# Patient Record
Sex: FEMALE | Race: WHITE | NOT HISPANIC OR LATINO | Employment: FULL TIME | URBAN - METROPOLITAN AREA
[De-identification: names, ages, dates, MRNs, and addresses within clinical notes are randomized per-mention and may not be internally consistent; named-entity substitution may affect disease eponyms.]

---

## 2020-09-04 NOTE — TELEPHONE ENCOUNTER
Pt last seen 8/24/2020, pt requesting a 90 day supply be sent to pharmacy at [facility] in [place].

## 2020-09-08 RX ORDER — NORETHINDRONE ACETATE AND ETHINYL ESTRADIOL AND FERROUS FUMARATE 1MG-20(24)
1 KIT ORAL DAILY
Qty: 84 TABLET | Refills: 4 | Status: SHIPPED | OUTPATIENT
Start: 2020-09-08 | End: 2020-09-08 | Stop reason: SDUPTHER

## 2020-09-08 RX ORDER — NORETHINDRONE ACETATE AND ETHINYL ESTRADIOL AND FERROUS FUMARATE 1MG-20(24)
1 KIT ORAL DAILY
Qty: 84 TABLET | Refills: 4 | Status: SHIPPED | OUTPATIENT
Start: 2020-09-08 | End: 2021-03-30 | Stop reason: SDUPTHER

## 2021-03-30 DIAGNOSIS — Z30.41 ENCOUNTER FOR SURVEILLANCE OF CONTRACEPTIVE PILLS: Primary | ICD-10-CM

## 2021-03-30 RX ORDER — NORETHINDRONE ACETATE AND ETHINYL ESTRADIOL 1MG-20(21)
1 KIT ORAL DAILY
Qty: 28 TABLET | Refills: 4 | Status: SHIPPED | OUTPATIENT
Start: 2021-03-30 | End: 2021-09-29

## 2021-03-30 RX ORDER — NORETHINDRONE ACETATE AND ETHINYL ESTRADIOL 1MG-20(21)
KIT ORAL
COMMUNITY
Start: 2021-03-14 | End: 2021-03-30 | Stop reason: SDUPTHER

## 2021-03-30 NOTE — TELEPHONE ENCOUNTER
Last annual was 08/24/2020, [facility] pharmacy sent fax stating we had prescribed Junel FE, but dispensed alternative (Blisovi) due to insurance coverage, requesting refills on Blisovi. PT not due for annual until 08/24/2021. Please advise, thanks.

## 2021-04-19 ENCOUNTER — TELEPHONE (OUTPATIENT)
Dept: OBSTETRICS AND GYNECOLOGY | Facility: CLINIC | Age: 26
End: 2021-04-19

## 2021-04-19 NOTE — TELEPHONE ENCOUNTER
Patient states she is on her honeymoon and is having swelling in vagina and yellow discharge and painful urination, and would like advise

## 2021-04-19 NOTE — TELEPHONE ENCOUNTER
Pt was  Sat and had IC for the first time. Sun she woke up very swollen with yellow discharge and burning with urination. Today the burning with urination is better but she is still very swollen and cont to have the yellow discharge.   Will d/w JNA and call pt back.

## 2021-04-19 NOTE — TELEPHONE ENCOUNTER
Per JNA; nothing worrisome at this point, most likely due to first time having IC. Detailed message left on VM for pt to call back with questions or concerns.

## 2021-07-18 PROCEDURE — 87147 CULTURE TYPE IMMUNOLOGIC: CPT | Performed by: FAMILY MEDICINE

## 2021-07-18 PROCEDURE — 87086 URINE CULTURE/COLONY COUNT: CPT | Performed by: FAMILY MEDICINE

## 2021-07-20 ENCOUNTER — TELEPHONE (OUTPATIENT)
Dept: URGENT CARE | Facility: CLINIC | Age: 26
End: 2021-07-20

## 2021-07-20 NOTE — TELEPHONE ENCOUNTER
----- Message from Edward Rinaldi MD sent at 7/20/2021  8:20 AM EDT -----  Urine culture grew greater than 100,000 staph species.  This would typically not be sensitive to the Macrobid which she is taken.  If she continues to have symptoms she would need to be switched to cephalexin 500 mg p.o. 3 times daily for 7 days.  If she is doing better then she can complete the current course of antibiotics

## 2021-09-29 ENCOUNTER — OFFICE VISIT (OUTPATIENT)
Dept: OBSTETRICS AND GYNECOLOGY | Facility: CLINIC | Age: 26
End: 2021-09-29

## 2021-09-29 VITALS
BODY MASS INDEX: 26.06 KG/M2 | WEIGHT: 156.4 LBS | SYSTOLIC BLOOD PRESSURE: 100 MMHG | HEIGHT: 65 IN | DIASTOLIC BLOOD PRESSURE: 64 MMHG

## 2021-09-29 DIAGNOSIS — Z01.419 WOMEN'S ANNUAL ROUTINE GYNECOLOGICAL EXAMINATION: ICD-10-CM

## 2021-09-29 DIAGNOSIS — Z01.419 WOMEN'S ANNUAL ROUTINE GYNECOLOGICAL EXAMINATION: Primary | ICD-10-CM

## 2021-09-29 PROCEDURE — 99395 PREV VISIT EST AGE 18-39: CPT | Performed by: NURSE PRACTITIONER

## 2021-09-29 RX ORDER — ETONOGESTREL AND ETHINYL ESTRADIOL 11.7; 2.7 MG/1; MG/1
INSERT, EXTENDED RELEASE VAGINAL
Qty: 1 EACH | Refills: 12 | Status: SHIPPED | OUTPATIENT
Start: 2021-09-29 | End: 2021-09-29

## 2021-09-29 NOTE — PROGRESS NOTES
GYN Annual Exam     CC - Here for annual exam.        JING Baker is a 25 y.o. female, , who presents for annual well woman exam. Patient's last menstrual period was 2021 (approximate)..  Periods are irregular.  Dysmenorrhea:mild, occurring premenstrually.  Patient reports problems with: none.  There were no changes to her medical or surgical history since her last visit.. Partner Status: Marital Status: .  She is sexually active. She has had new partners since her last STD testing. She has had her HPV vaccines. She is currently on Blisovi  and she is interested in changing to Nuva Ring.     Additional OB/GYN History   Current contraception: oral contraceptive pills  Desires to: continue contraception- discuss nuvaring  Last Pap : 2020- pap w/ reflex- negative  Last Completed Pap Smear     This patient has no relevant Health Maintenance data.        History of abnormal Pap smear: no  Family history of uterine, colon, breast, or ovarian cancer: no  Performs monthly Self-Breast Exam: no  Exercises Regularly:yes  Feelings of Anxiety or Depression: no  Tobacco Usage?: No   OB History        0    Para   0    Term   0       0    AB   0    Living   0       SAB   0    TAB   0    Ectopic   0    Molar   0    Multiple   0    Live Births   0                Health Maintenance   Topic Date Due   • Annual Gynecologic Pelvic and Breast Exam  Never done   • ANNUAL PHYSICAL  Never done   • HPV VACCINES (1 - 2-dose series) Never done   • COVID-19 Vaccine (1) Never done   • TDAP/TD VACCINES (1 - Tdap) Never done   • HEPATITIS C SCREENING  Never done   • PAP SMEAR  Never done   • INFLUENZA VACCINE  10/01/2021   • Pneumococcal Vaccine 0-64  Aged Out       The additional following portions of the patient's history were reviewed and updated as appropriate: allergies, current medications, past family history, past medical history, past social history, past surgical history and problem  "list.    Review of Systems   Constitutional: Negative.    HENT: Negative.    Eyes: Negative.    Respiratory: Negative.    Cardiovascular: Negative.    Gastrointestinal: Negative.    Endocrine: Negative.    Genitourinary: Negative.    Musculoskeletal: Negative.    Skin: Negative.    Allergic/Immunologic: Negative.    Neurological: Negative.    Hematological: Negative.    Psychiatric/Behavioral: Negative.          I have reviewed and agree with the HPI, ROS, and historical information as entered above. Aicha Gaona, APRN    Objective   /64   Ht 165.1 cm (65\")   Wt 70.9 kg (156 lb 6.4 oz)   LMP 07/13/2021 (Approximate)   Breastfeeding No   BMI 26.03 kg/m²     Physical Exam  Vitals and nursing note reviewed. Exam conducted with a chaperone present.   Constitutional:       Appearance: Normal appearance. She is normal weight.   Cardiovascular:      Rate and Rhythm: Normal rate and regular rhythm.   Chest:      Breasts:         Right: Normal.         Left: Normal.   Abdominal:      Palpations: Abdomen is soft.   Genitourinary:     General: Normal vulva.      Vagina: Normal.      Cervix: Normal.      Uterus: Normal.       Adnexa: Right adnexa normal and left adnexa normal.      Rectum: Normal.   Neurological:      Mental Status: She is alert.            Assessment and Plan    Problem List Items Addressed This Visit     None      Visit Diagnoses     Women's annual routine gynecological examination    -  Primary    Relevant Orders    Pap IG, Ct-Ng, Rfx HPV ASCU          1. GYN annual well woman exam.   2. She was due to start ocps 3 days ago. She is interested in starting Nuva Ring instead. Will put in Nuva Ring tonight.   3. OCP's/Vaginal Ring - Discussed side effects of nausea, BTB, headaches, breast tenderness and slight weight gain in the first three cycles.  Understands risks of blood clots, stroke, and theoretical risk of breast cancer.  Denies family history of blood clots.  4. Reviewed monthly self breast " exams.  Instructed to call with lumps, pain, or breast discharge.    5. Reviewed exercise as a preventative health measures.   6. Reccommended Flu Vaccine in Fall of each year.  7. RTC in 1 year or PRN with problems  No follow-ups on file.      Aicha Gaona, APRN  09/29/2021

## 2021-10-04 RX ORDER — ETONOGESTREL AND ETHINYL ESTRADIOL 11.7; 2.7 MG/1; MG/1
INSERT, EXTENDED RELEASE VAGINAL
Qty: 3 EACH | Refills: 0 | Status: SHIPPED | OUTPATIENT
Start: 2021-10-04 | End: 2022-10-26

## 2021-10-07 DIAGNOSIS — Z01.419 WOMEN'S ANNUAL ROUTINE GYNECOLOGICAL EXAMINATION: ICD-10-CM

## 2021-10-22 DIAGNOSIS — Z30.41 ENCOUNTER FOR SURVEILLANCE OF CONTRACEPTIVE PILLS: ICD-10-CM

## 2021-10-22 RX ORDER — NORETHINDRONE ACETATE AND ETHINYL ESTRADIOL 1MG-20(21)
KIT ORAL
Qty: 56 TABLET | OUTPATIENT
Start: 2021-10-22

## 2022-10-26 ENCOUNTER — OFFICE VISIT (OUTPATIENT)
Dept: OBSTETRICS AND GYNECOLOGY | Facility: CLINIC | Age: 27
End: 2022-10-26

## 2022-10-26 VITALS
SYSTOLIC BLOOD PRESSURE: 122 MMHG | WEIGHT: 164 LBS | HEIGHT: 65 IN | DIASTOLIC BLOOD PRESSURE: 60 MMHG | BODY MASS INDEX: 27.32 KG/M2

## 2022-10-26 DIAGNOSIS — Z01.419 WOMEN'S ANNUAL ROUTINE GYNECOLOGICAL EXAMINATION: Primary | ICD-10-CM

## 2022-10-26 PROCEDURE — 99395 PREV VISIT EST AGE 18-39: CPT | Performed by: NURSE PRACTITIONER

## 2022-10-26 NOTE — PROGRESS NOTES
Gynecologic Annual Exam Note        Gynecologic Exam        Subjective     HPI  Иван Baker is a 26 y.o.  female who presents for annual well woman exam as a established patient. There were no changes to her medical or surgical history since her last visit.. Patient reports problems with: none. Patient's last menstrual period was 10/06/2022 (exact date).. Her periods are regular every 25-35 days, lasting 4 days. The flow is moderate. Dysmenorrhea:moderate, occurring first 1-2 days of flow. . Partner Status: Marital Status: .  She is sexually active. She has not had new partners.. STD testing recommendations have been explained to the patient and she does not desire STD testing. Patient reports she stopped using Nuvaring in 2022.    Additional OB/GYN History   Current contraception: contraceptive methods: Condoms  Desires to: do not start contraception      History of STD: no    Last Pap : 2021. Results: negative. HPV: not done STD: Neg  Last Completed Pap Smear          Ordered - PAP SMEAR (Every 3 Years) Ordered on 10/26/2022    2021  SCANNED - PAP SMEAR                 History of abnormal Pap smear: no  Gardasil status:completed  Family history of uterine, colon, breast, or ovarian cancer: no  Performs monthly Self-Breast Exam: no  Exercises Regularly:yes  Feelings of Anxiety or Depression: no  Tobacco Usage?: No     No current outpatient medications on file.     Patient denies the need for medication refills today.    OB History        0    Para   0    Term   0       0    AB   0    Living   0       SAB   0    IAB   0    Ectopic   0    Molar   0    Multiple   0    Live Births   0                Health Maintenance   Topic Date Due   • COVID-19 Vaccine (1) Never done   • ANNUAL PHYSICAL  Never done   • HPV VACCINES (1 - 2-dose series) Never done   • TDAP/TD VACCINES (1 - Tdap) Never done   • HEPATITIS C SCREENING  Never done   • INFLUENZA VACCINE  Never done   •  "Annual Gynecologic Pelvic and Breast Exam  09/30/2022   • PAP SMEAR  09/29/2024   • Pneumococcal Vaccine 0-64  Aged Out       Past Medical History:   Diagnosis Date   • Dysmenorrhea    • HPV vaccines complete    • Irregular menses    • Pap smear 08/19/2020    pap w/ reflex, GC/CHL- negative   • Pap smear 08/24/2020    pap w/ reflex- negative        History reviewed. No pertinent surgical history.    The additional following portions of the patient's history were reviewed and updated as appropriate: allergies, current medications, past family history, past medical history, past social history and past surgical history.    Review of Systems   Constitutional: Negative.    Respiratory: Negative.    Cardiovascular: Negative.    Gastrointestinal: Negative.    Genitourinary: Negative.          I have reviewed and agree with the HPI, ROS, and historical information as entered above. Any Mazariegos, APRN        Objective   /60 (BP Location: Right arm, Patient Position: Sitting, Cuff Size: Adult)   Ht 165.1 cm (65\")   Wt 74.4 kg (164 lb)   LMP 10/06/2022 (Exact Date)   Breastfeeding No   BMI 27.29 kg/m²     Physical Exam  Vitals and nursing note reviewed. Exam conducted with a chaperone present.   Constitutional:       Appearance: She is well-developed.   HENT:      Head: Normocephalic and atraumatic.   Neck:      Thyroid: No thyroid mass or thyromegaly.   Cardiovascular:      Heart sounds: No murmur heard.  Pulmonary:      Effort: Pulmonary effort is normal. No retractions.      Breath sounds: No wheezing, rhonchi or rales.   Chest:      Chest wall: No mass or tenderness.   Breasts:     Right: Normal. No mass, nipple discharge, skin change or tenderness.      Left: Normal. No mass, nipple discharge, skin change or tenderness.   Abdominal:      Palpations: Abdomen is soft. Abdomen is not rigid. There is no mass.      Tenderness: There is no abdominal tenderness. There is no guarding.      Hernia: No hernia is " present. There is no hernia in the left inguinal area.   Genitourinary:     Labia:         Right: No rash, tenderness or lesion.         Left: No rash, tenderness or lesion.       Vagina: Normal. No vaginal discharge or lesions.      Cervix: No cervical motion tenderness, discharge, lesion or cervical bleeding.      Uterus: Normal. Not enlarged, not fixed and not tender.       Adnexa:         Right: No mass or tenderness.          Left: No mass or tenderness.        Rectum: No external hemorrhoid.   Musculoskeletal:      Cervical back: Normal range of motion. No muscular tenderness.   Neurological:      Mental Status: She is alert and oriented to person, place, and time.   Psychiatric:         Behavior: Behavior normal.            Assessment and Plan    Problem List Items Addressed This Visit    None  Visit Diagnoses     Women's annual routine gynecological examination    -  Primary    Relevant Orders    LIQUID-BASED PAP SMEAR, [facility] ([facility])        Happy using condoms    1. GYN annual well woman exam.   2. Reviewed pap guidelines.   3. Reviewed monthly self breast exams.  Instructed to call with lumps, pain, or breast discharge.    Return in about 1 year (around 10/26/2023) for Annual physical.      Any Mazariegos, APRN  10/26/2022

## 2022-10-27 LAB — REF LAB TEST METHOD: NORMAL

## 2023-11-02 ENCOUNTER — OFFICE VISIT (OUTPATIENT)
Dept: OBSTETRICS AND GYNECOLOGY | Facility: CLINIC | Age: 28
End: 2023-11-02
Payer: COMMERCIAL

## 2023-11-02 VITALS
DIASTOLIC BLOOD PRESSURE: 82 MMHG | SYSTOLIC BLOOD PRESSURE: 120 MMHG | HEIGHT: 65 IN | BODY MASS INDEX: 26.62 KG/M2 | WEIGHT: 159.8 LBS

## 2023-11-02 DIAGNOSIS — Z01.419 WELL WOMAN EXAM: Primary | ICD-10-CM

## 2023-11-02 NOTE — PROGRESS NOTES
Gynecologic Annual Exam Note        Annual Exam        Subjective     HPI  Иван Baker is a 27 y.o.  female who presents for annual well woman exam as an established patient. There were no changes to her medical or surgical history since her last visit. Patient reports no problems. Patient's last menstrual period was 10/15/2023 (exact date).. Her periods occur every 25-35 days , lasting 4 days. The flow is moderate. She reports dysmenorrhea is mild, occurring first 1-2 days of flow. Marital Status: .  She is sexually active. She has not had new partners. STD testing recommendations have been explained to the patient and she does not desire STD testing.    Additional OB/GYN History   Current contraception: contraceptive methods: Withdrawal   Desires to: do not start contraception- pt states it would be okay if they got pregnant-not trying but not interested in starting anything.   Thromboembolic Disease: none    History of STD: no    Last Pap : 10/26/22. Results: negative. HPV: not done.   Last Completed Pap Smear            Ordered - PAP SMEAR (Every 3 Years) Ordered on 2023      10/26/2022  LIQUID-BASED PAP SMEAR, [facility] ([facility])    2021  SCANNED - PAP SMEAR                     History of abnormal Pap smear: no  Gardasil status:completed  Family history of uterine, colon, breast, or ovarian cancer: no  Performs monthly Self-Breast Exam: no  Exercises Regularly:yes  Feelings of Anxiety or Depression: no  Tobacco Usage?: No     No current outpatient medications on file.     Patient denies the need for medication refills today.    OB History          0    Para   0    Term   0       0    AB   0    Living   0         SAB   0    IAB   0    Ectopic   0    Molar   0    Multiple   0    Live Births   0                Health Maintenance   Topic Date Due    BMI FOLLOWUP  Never done    COVID-19 Vaccine (1) Never done    HPV VACCINES (1 - 2-dose series) Never done    TDAP/TD  "VACCINES (1 - Tdap) Never done    HEPATITIS C SCREENING  Never done    ANNUAL PHYSICAL  Never done    INFLUENZA VACCINE  Never done    Annual Gynecologic Pelvic and Breast Exam  10/27/2023    PAP SMEAR  10/26/2025    Pneumococcal Vaccine 0-64  Aged Out       Past Medical History:   Diagnosis Date    Dysmenorrhea     HPV vaccines complete     Irregular menses     Pap smear 08/19/2020    pap w/ reflex, GC/CHL- negative    Pap smear 08/24/2020    pap w/ reflex- negative        History reviewed. No pertinent surgical history.    The additional following portions of the patient's history were reviewed and updated as appropriate: allergies, current medications, past family history, past medical history, past social history, past surgical history, and problem list.    Review of Systems   Constitutional: Negative.    HENT: Negative.     Eyes: Negative.    Respiratory: Negative.     Cardiovascular: Negative.    Gastrointestinal: Negative.    Endocrine: Negative.    Genitourinary: Negative.    Musculoskeletal: Negative.    Skin: Negative.    Allergic/Immunologic: Negative.    Neurological: Negative.    Hematological: Negative.    Psychiatric/Behavioral: Negative.           I have reviewed and agree with the HPI, ROS, and historical information as entered above. SENG Bruner          Objective   /82 (BP Location: Right arm, Patient Position: Sitting, Cuff Size: Adult)   Ht 165.1 cm (65\")   Wt 72.5 kg (159 lb 12.8 oz)   LMP 10/15/2023 (Exact Date)   Breastfeeding No   BMI 26.59 kg/m²     Physical Exam  Vitals and nursing note reviewed. Exam conducted with a chaperone present.   Constitutional:       General: She is not in acute distress.     Appearance: Normal appearance. She is well-developed and normal weight. She is not ill-appearing.   Neck:      Thyroid: No thyroid mass or thyromegaly.   Pulmonary:      Effort: Pulmonary effort is normal. No respiratory distress or retractions.   Chest:      Chest wall: No " mass.   Breasts:     Right: Normal. No mass, nipple discharge, skin change or tenderness.      Left: Normal. No mass, nipple discharge, skin change or tenderness.   Abdominal:      General: There is no distension.      Palpations: Abdomen is soft. Abdomen is not rigid. There is no mass.      Tenderness: There is no abdominal tenderness. There is no guarding or rebound.      Hernia: No hernia is present.   Genitourinary:     General: Normal vulva.      Exam position: Lithotomy position.      Labia:         Right: No rash, tenderness or lesion.         Left: No rash, tenderness or lesion.       Vagina: Normal. No vaginal discharge or lesions.      Cervix: Normal. No cervical motion tenderness, discharge, lesion, erythema or cervical bleeding.      Uterus: Normal. Not enlarged, not fixed and not tender.       Adnexa: Right adnexa normal and left adnexa normal.        Right: No mass or tenderness.          Left: No mass or tenderness.        Rectum: Normal. No external hemorrhoid.   Musculoskeletal:      Cervical back: No muscular tenderness.   Skin:     General: Skin is warm and dry.   Neurological:      Mental Status: She is alert and oriented to person, place, and time.   Psychiatric:         Mood and Affect: Mood normal.         Behavior: Behavior normal.            Assessment and Plan    Problem List Items Addressed This Visit    None  Visit Diagnoses       Well woman exam    -  Primary    Relevant Orders    LIQUID-BASED PAP SMEAR WITH HPV GENOTYPING REGARDLESS OF INTERPRETATION ([facility])            GYN annual well woman exam.   Reviewed pap guidelines. Obtained today.   Reviewed monthly self breast exams.  Instructed to call with lumps, pain, or breast discharge.    Reccommended Flu Vaccine in Fall of each year.  Recommended to be taking a PNV since she is not using contraceptives.   Return in about 1 year (around 11/2/2024) for Annual physical.      Daisha Garrido, SENG  11/02/2023

## 2023-11-03 LAB — REF LAB TEST METHOD: NORMAL

## 2024-11-12 ENCOUNTER — INITIAL PRENATAL (OUTPATIENT)
Dept: OBSTETRICS AND GYNECOLOGY | Facility: CLINIC | Age: 29
End: 2024-11-12
Payer: COMMERCIAL

## 2024-11-12 VITALS — SYSTOLIC BLOOD PRESSURE: 118 MMHG | DIASTOLIC BLOOD PRESSURE: 76 MMHG | WEIGHT: 157 LBS | BODY MASS INDEX: 26.13 KG/M2

## 2024-11-12 DIAGNOSIS — Z34.81 MULTIGRAVIDA IN FIRST TRIMESTER: Primary | ICD-10-CM

## 2024-11-12 LAB
BILIRUB BLD-MCNC: NEGATIVE MG/DL
CLARITY, POC: CLEAR
COLOR UR: YELLOW
GLUCOSE UR STRIP-MCNC: NEGATIVE MG/DL
KETONES UR QL: NEGATIVE
LEUKOCYTE EST, POC: ABNORMAL
NITRITE UR-MCNC: NEGATIVE MG/ML
PH UR: 7 [PH] (ref 5–8)
PROT UR STRIP-MCNC: NEGATIVE MG/DL
RBC # UR STRIP: NEGATIVE /UL
SP GR UR: 1.01 (ref 1–1.03)
UROBILINOGEN UR QL: ABNORMAL

## 2024-11-12 NOTE — PROGRESS NOTES
Initial ob visit     CC- Here for care of pregnancy        Иван Baker is a 29 y.o. female, , who presents for her first obstetrical visit.  Patient's last menstrual period was 2024.. Her SHERRI is 2025, by Last Menstrual Period. Current GA is 7w3d.     Initial positive test date : 10/21/24, UPT        Her periods are every 30 days.  Prior obstetric issues: last pregnancy was a chemical pregnancy  Patient's past medical history is significant for:  none .  Family history of genetic issues (includes FOB): none  Prior infections concerning in pregnancy (Rash, fever in last 2 weeks): No  Varicella Hx - vaccinated  Prior testing for Cystic Fibrosis Carrier or Sickle Cell Trait- no  Prepregnancy BMI - Body mass index is 26.13 kg/m².  History of STD: no  Hx of HSV for patient or partner: no  Ultrasound Today: yes    OB History    Para Term  AB Living   2 0 0 0 1 0   SAB IAB Ectopic Molar Multiple Live Births   0 0 0 0 0 0      # Outcome Date GA Lbr Ted/2nd Weight Sex Type Anes PTL Lv   2 Current            1 AB               Obstetric Comments   Chemical pregnancy       Additional Pertinent History   Last Pap : 23 Result: negative HPV: negative     Last Completed Pap Smear            PAP SMEAR (Every 3 Years) Next due on 2023  LIQUID-BASED PAP SMEAR WITH HPV GENOTYPING REGARDLESS OF INTERPRETATION ([facility])    10/26/2022  LIQUID-BASED PAP SMEAR, [facility] ([facility])    2021  SCANNED - PAP SMEAR                  History of abnormal Pap smear: no  Family history of uterine, colon, breast, or ovarian cancer: no  Feelings of Anxiety or Depression: no  Tobacco Usage?: No   Alcohol/Drug Use?: no  Over the age of 35 at delivery: no  Genetic Screening: desires wants to discuss with provider  Flu Status: Declines    PMH    Current Outpatient Medications:     PRENATAL VIT W/ FE BISG-FA PO, Take  by mouth., Disp: , Rfl:      Past Medical History:   Diagnosis  Date    Dysmenorrhea     HPV vaccines complete     Irregular menses     Pap smear 08/19/2020    pap w/ reflex, GC/CHL- negative    Pap smear 08/24/2020    pap w/ reflex- negative        History reviewed. No pertinent surgical history.    Review of Systems   Review of Systems   Constitutional: Negative.    HENT: Negative.     Eyes: Negative.    Respiratory: Negative.     Cardiovascular: Negative.    Gastrointestinal: Negative.    Endocrine: Negative.    Genitourinary: Negative.    Musculoskeletal: Negative.    Skin: Negative.    Allergic/Immunologic: Negative.    Neurological: Negative.    Hematological: Negative.    Psychiatric/Behavioral: Negative.         Patient Reports:  nausea, breast tenderness  Patient Denies:excessive nausea , excessive vomiting, and vaginal bleeding  All systems reviewed and otherwise normal.    I have reviewed and agree with the HPI, ROS, and historical information as entered above. Lesley Springer MD      /76   Wt 71.2 kg (157 lb)   LMP 09/21/2024   BMI 26.13 kg/m²     The additional following portions of the patient's history were reviewed and updated as appropriate: allergies, current medications, past family history, past medical history, past social history, past surgical history, and problem list.    Physical Exam  General:  well developed; well nourished  no acute distress  mentation appropriate   Chest/Respiratory: No labored breathing, normal respiratory effort, normal appearance, no respiratory noises noted   Heart:  not examined   Thyroid: normal to inspection and palpation   Breasts:  Not performed.   Abdomen: soft, non-tender; no masses  no umbilical or inguinal hernias are present  no hepato-splenomegaly   Pelvis: Not performed.        Assessment and Plan    Problem List Items Addressed This Visit       Multigravida in first trimester - Primary    Relevant Orders    POC Urinalysis Dipstick (Completed)    Obstetric Panel    HIV-1 / O / 2 Ag / Antibody    Urine  Culture - Urine, Urine, Clean Catch    Urinalysis With Microscopic - Urine, Clean Catch    Chlamydia trachomatis, Neisseria gonorrhoeae, PCR - Urine, Urine, Clean Catch    Urine Drug Screen - Urine, Clean Catch       Pregnancy at 7w3d  Reviewed routine prenatal care with the office and educational materials given  Lab(s) Ordered  Discussed options for genetic testing including first trimester nuchal translucency screen, genetic disease carrier testing, quadruple screen, and NIPT  Discontinue the use of all non-medicinal drugs and chemicals  Nausea/Vomiting - she does not desire medications at this time.  Discussed conservative ways to help with nausea.  Patient is on Prenatal vitamins  Activity recommendation : 150 minutes/week of moderate intensity aerobic activity unless we limit for bleeding, hypertension or other pregnancy complication   Return in about 4 weeks (around 12/10/2024) for Next scheduled follow up.      Lesley Springer MD  11/12/2024

## 2024-11-13 LAB
ABO GROUP BLD: ABNORMAL
AMPHETAMINES UR QL SCN: NEGATIVE NG/ML
APPEARANCE UR: CLEAR
BACTERIA #/AREA URNS HPF: ABNORMAL /HPF
BARBITURATES UR QL SCN: NEGATIVE NG/ML
BASOPHILS # BLD AUTO: 0.1 X10E3/UL (ref 0–0.2)
BASOPHILS NFR BLD AUTO: 1 %
BENZODIAZ UR QL SCN: NEGATIVE NG/ML
BILIRUB UR QL STRIP: NEGATIVE
BLD GP AB SCN SERPL QL: NEGATIVE
BZE UR QL SCN: NEGATIVE NG/ML
C TRACH RRNA SPEC QL NAA+PROBE: NEGATIVE
CANNABINOIDS UR QL SCN: NEGATIVE NG/ML
CASTS URNS MICRO: ABNORMAL
COLOR UR: YELLOW
CREAT UR-MCNC: 213.1 MG/DL (ref 20–300)
EOSINOPHIL # BLD AUTO: 0.1 X10E3/UL (ref 0–0.4)
EOSINOPHIL NFR BLD AUTO: 1 %
EPI CELLS #/AREA URNS HPF: ABNORMAL /HPF
ERYTHROCYTE [DISTWIDTH] IN BLOOD BY AUTOMATED COUNT: 11.6 % (ref 11.7–15.4)
GLUCOSE UR QL STRIP: NEGATIVE
HBV SURFACE AG SERPL QL IA: NEGATIVE
HCT VFR BLD AUTO: 40 % (ref 34–46.6)
HCV IGG SERPL QL IA: NON REACTIVE
HGB BLD-MCNC: 13.2 G/DL (ref 11.1–15.9)
HGB UR QL STRIP: NEGATIVE
HIV 1+2 AB+HIV1 P24 AG SERPL QL IA: NON REACTIVE
IMM GRANULOCYTES # BLD AUTO: 0 X10E3/UL (ref 0–0.1)
IMM GRANULOCYTES NFR BLD AUTO: 0 %
KETONES UR QL STRIP: NEGATIVE
LABORATORY COMMENT REPORT: NORMAL
LEUKOCYTE ESTERASE UR QL STRIP: NEGATIVE
LYMPHOCYTES # BLD AUTO: 2 X10E3/UL (ref 0.7–3.1)
LYMPHOCYTES NFR BLD AUTO: 17 %
MCH RBC QN AUTO: 31 PG (ref 26.6–33)
MCHC RBC AUTO-ENTMCNC: 33 G/DL (ref 31.5–35.7)
MCV RBC AUTO: 94 FL (ref 79–97)
METHADONE UR QL SCN: NEGATIVE NG/ML
MONOCYTES # BLD AUTO: 1 X10E3/UL (ref 0.1–0.9)
MONOCYTES NFR BLD AUTO: 8 %
N GONORRHOEA RRNA SPEC QL NAA+PROBE: NEGATIVE
NEUTROPHILS # BLD AUTO: 8.8 X10E3/UL (ref 1.4–7)
NEUTROPHILS NFR BLD AUTO: 73 %
NITRITE UR QL STRIP: NEGATIVE
OPIATES UR QL SCN: NEGATIVE NG/ML
OXYCODONE+OXYMORPHONE UR QL SCN: NEGATIVE NG/ML
PCP UR QL: NEGATIVE NG/ML
PH UR STRIP: 8 [PH] (ref 5–8)
PH UR: 7.9 [PH] (ref 4.5–8.9)
PLATELET # BLD AUTO: 312 X10E3/UL (ref 150–450)
PROPOXYPH UR QL SCN: NEGATIVE NG/ML
PROT UR QL STRIP: ABNORMAL
RBC # BLD AUTO: 4.26 X10E6/UL (ref 3.77–5.28)
RBC #/AREA URNS HPF: ABNORMAL /HPF
RH BLD: POSITIVE
RPR SER QL: NON REACTIVE
RUBV IGG SERPL IA-ACNC: 1.25 INDEX
SP GR UR STRIP: 1.03 (ref 1–1.03)
UROBILINOGEN UR STRIP-MCNC: ABNORMAL MG/DL
WBC # BLD AUTO: 11.9 X10E3/UL (ref 3.4–10.8)
WBC #/AREA URNS HPF: ABNORMAL /HPF

## 2024-11-14 LAB
BACTERIA UR CULT: NORMAL
BACTERIA UR CULT: NORMAL

## 2024-11-21 ENCOUNTER — TELEPHONE (OUTPATIENT)
Dept: OBSTETRICS AND GYNECOLOGY | Facility: CLINIC | Age: 29
End: 2024-11-21
Payer: COMMERCIAL

## 2024-11-21 NOTE — TELEPHONE ENCOUNTER
MACY SCHUSTER     [phone]    PT WAS A PT OF DR MURRAY 3yrs AGO     PT SAW DR RENTERIA 11/12/24 & DECIDED SHE WOULD LIKE TO TRANSFER CARE TO DR MURRAY IF SHE DOES NOT HAVE ANY AVAILABILITIES SHE CAN SEE DR MICHAELS.    PT HAS NO TIES TO ANY PROVIDER & SHE WILL DECIDE WHICH PROVIDER SHE WANTS TO SEE FOR THE DURATION OF HER PREGNANCY.

## 2025-04-10 ENCOUNTER — LAB (OUTPATIENT)
Dept: LAB | Facility: HOSPITAL | Age: 30
End: 2025-04-10
Payer: COMMERCIAL

## 2025-04-10 ENCOUNTER — TRANSCRIBE ORDERS (OUTPATIENT)
Dept: LAB | Facility: HOSPITAL | Age: 30
End: 2025-04-10

## 2025-04-10 DIAGNOSIS — Z3A.28 28 WEEKS GESTATION OF PREGNANCY: ICD-10-CM

## 2025-04-10 DIAGNOSIS — Z34.93 THIRD TRIMESTER PREGNANCY: ICD-10-CM

## 2025-04-10 DIAGNOSIS — Z34.93 THIRD TRIMESTER PREGNANCY: Primary | ICD-10-CM

## 2025-04-10 LAB
BLD GP AB SCN SERPL QL: NEGATIVE
DEPRECATED RDW RBC AUTO: 41.9 FL (ref 37–54)
ERYTHROCYTE [DISTWIDTH] IN BLOOD BY AUTOMATED COUNT: 12.1 % (ref 12.3–15.4)
GLUCOSE 1H P 100 G GLC PO SERPL-MCNC: 88 MG/DL (ref 65–139)
HCT VFR BLD AUTO: 39.7 % (ref 34–46.6)
HGB BLD-MCNC: 12.9 G/DL (ref 12–15.9)
MCH RBC QN AUTO: 30.7 PG (ref 26.6–33)
MCHC RBC AUTO-ENTMCNC: 32.5 G/DL (ref 31.5–35.7)
MCV RBC AUTO: 94.5 FL (ref 79–97)
PLATELET # BLD AUTO: 247 10*3/MM3 (ref 140–450)
PMV BLD AUTO: 10.7 FL (ref 6–12)
RBC # BLD AUTO: 4.2 10*6/MM3 (ref 3.77–5.28)
TREPONEMA PALLIDUM IGG+IGM AB [PRESENCE] IN SERUM OR PLASMA BY IMMUNOASSAY: NORMAL
WBC NRBC COR # BLD AUTO: 14.86 10*3/MM3 (ref 3.4–10.8)

## 2025-04-10 PROCEDURE — 36415 COLL VENOUS BLD VENIPUNCTURE: CPT

## 2025-04-10 PROCEDURE — 86850 RBC ANTIBODY SCREEN: CPT

## 2025-04-10 PROCEDURE — 82948 REAGENT STRIP/BLOOD GLUCOSE: CPT | Performed by: ADVANCED PRACTICE MIDWIFE

## 2025-04-10 PROCEDURE — 82950 GLUCOSE TEST: CPT

## 2025-04-10 PROCEDURE — 85027 COMPLETE CBC AUTOMATED: CPT

## 2025-04-10 PROCEDURE — 86780 TREPONEMA PALLIDUM: CPT

## 2025-05-18 ENCOUNTER — HOSPITAL ENCOUNTER (OUTPATIENT)
Facility: HOSPITAL | Age: 30
Discharge: HOME | End: 2025-05-18
Attending: ADVANCED PRACTICE MIDWIFE | Admitting: OBSTETRICS & GYNECOLOGY
Payer: COMMERCIAL

## 2025-05-18 VITALS
BODY MASS INDEX: 31.65 KG/M2 | RESPIRATION RATE: 16 BRPM | DIASTOLIC BLOOD PRESSURE: 72 MMHG | TEMPERATURE: 97.8 F | OXYGEN SATURATION: 99 % | HEART RATE: 76 BPM | SYSTOLIC BLOOD PRESSURE: 107 MMHG | HEIGHT: 65 IN | WEIGHT: 190 LBS

## 2025-05-18 PROCEDURE — G0463 HOSPITAL OUTPT CLINIC VISIT: HCPCS

## 2025-05-18 NOTE — H&P
"[facility]  [facility]    CC:    Swelling     HPI:    Patient is a 29 y.o. female  currently at 34w1d, who presents with a concern of swelling and a headache.   The headache is dull and not bad.  She has not taken anything to try to resolve it.  The swelling is in her feet and left wrist which has given her carpel tunnel syndrome.    Denies vaginal leaking and bleeding.  +FM        The following portions of the patients history were reviewed and updated as appropriate: current medications, allergies, past medical history, past surgical history, past family history, past social history and problem list .       Prenatal Information:   Maternal Prenatal Labs  Blood Type No results found for: \"ABO\"   Rh Status No results found for: \"RH\"   Antibody Screen No results found for: \"ABSCRN\"   Gonnorhea No results found for: \"GCCX\"   Chlamydia No results found for: \"CLAMYDCU\"   RPR No results found for: \"RPR\"   Syphilis Antibody No results found for: \"SYPHILIS\"   Rubella No results found for: \"RUBELLAIGGIN\"   Hepatitis B Surface Antigen No results found for: \"HEPBSAG\"   HIV-1 Antibody No results found for: \"LABHIV1\"   Hepatitis C Antibody No results found for: \"HEPCAB\"   Rapid Urin Drug Screen No results found for: \"AMPMETHU\", \"BARBITSCNUR\", \"LABBENZSCN\", \"LABMETHSCN\", \"LABOPIASCN\", \"THCURSCR\", \"COCAINEUR\", \"AMPHETSCREEN\", \"PROPOXSCN\", \"BUPRENORSCNU\", \"METAMPSCNUR\", \"OXYCODONESCN\", \"TRICYCLICSCN\"   Group B Strep Culture No results found for: \"GBSANTIGEN\"           External Prenatal Results       Pregnancy Outside Results - Transcribed From Office Records - See Scanned Records For Details       Test Value Date Time    ABO  A  24    Rh  Positive  24    Antibody Screen  Negative  04/10/25 1006       Negative  24    Varicella IgG       Rubella  1.25 index 24    Hgb  12.9 g/dL 04/10/25 1006       13.2 g/dL 24 0924    Hct  39.7 % 04/10/25 1006       40.0 " % 24    HgB A1c        1h GTT  88 mg/dL 04/10/25 1006    3h GTT Fasting       3h GTT 1 hour       3h GTT 2 hour       3h GTT 3 hour        Gonorrhea (discrete)  Negative  24    Chlamydia (discrete)  Negative  24    RPR  Non Reactive  24    Syphils cascade: TP-Ab (FTA)  Non-Reactive  04/10/25 1006    TP-Ab  Non-Reactive  04/10/25 1006    TP-Ab (EIA)       TPPA       HBsAg  Negative  24    Herpes Simplex Virus PCR       Herpes Simplex VIrus Culture       HIV  Non Reactive  24    Hep C RNA Quant PCR       Hep C Antibody  Non Reactive  24    AFP       NIPT       Cystic Fibrosis ([place])       Cystic Fibroisis        Spinal Muscular atrophy       Fragile X       Group B Strep       GBS Susceptibility to Clindamycin       GBS Susceptibility to Erythromycin       Fetal Fibronectin       Genetic Testing, Maternal Blood                 Drug Screening       Test Value Date Time    Urine Drug Screen       Amphetamine Screen  Negative ng/mL 24    Barbiturate Screen  Negative ng/mL 24    Benzodiazepine Screen  Negative ng/mL 24    Methadone Screen  Negative ng/mL 24    Phencyclidine Screen  Negative ng/mL 24    Opiates Screen       THC Screen       Cocaine Screen       Propoxyphene Screen  Negative ng/mL 24    Buprenorphine Screen       Methamphetamine Screen       Oxycodone Screen       Tricyclic Antidepressants Screen                 Legend    ^: Historical                              Past OB History:     OB History    Para Term  AB Living   2 0 0 0 1 0   SAB IAB Ectopic Molar Multiple Live Births   0 0 0 0 0 0      # Outcome Date GA Lbr Ted/2nd Weight Sex Type Anes PTL Lv   2 Current            1 AB               Obstetric Comments   Chemical pregnancy       Past Medical History: Past Medical History:   Diagnosis Date    Dysmenorrhea     HPV vaccines complete      Irregular menses     Pap smear 08/19/2020    pap w/ reflex, GC/CHL- negative    Pap smear 08/24/2020    pap w/ reflex- negative      Past Surgical History No past surgical history on file.   Family History: Family History   Problem Relation Age of Onset    No Known Problems Father     No Known Problems Mother     Breast cancer Neg Hx     Ovarian cancer Neg Hx     Uterine cancer Neg Hx     Colon cancer Neg Hx       Social History:  reports that she has never smoked. She has never used smokeless tobacco.   reports that she does not currently use alcohol.   reports no history of drug use.            Objective     Vital Signs Range for the last 24 hours  Temperature: Temp:  [97.8 °F (36.6 °C)] 97.8 °F (36.6 °C)   Temp Source: Temp src: Oral   BP: BP: (107-121)/(72-83) 107/72   Pulse: Heart Rate:  [76] 76   Respirations: Resp:  [16] 16   SPO2: SpO2:  [99 %] 99 %   O2 Amount (l/min):     O2 Devices     Weight: Weight:  [86.2 kg (190 lb)] 86.2 kg (190 lb)     Physical Examination: General appearance - alert, well appearing, and in no distress and oriented to person, place, and time  Chest - Breathing is unlaboured   Heart - Regula rate   Abdomen - soft, nontender, nondistended, gravid non-tender  Extremities - pedal edema +1         Fetal Heart Rate Assessment   Method: Fetal HR Assessment Method: external   Beats/min: Fetal HR (beats/min): 130   Baseline: Fetal HR Baseline: normal range   Varibility: Fetal HR Variability: moderate (amplitude range 6 to 25 bpm)   Accels: Fetal HR Accelerations: greater than/equal to 15 bpm, lasting at least 15 seconds   Decels: Fetal HR Decelerations: absent   Tracing Category:       Uterine Assessment   Method: Method: external tocotransducer   Frequency (min): Contraction Frequency (Minutes): x2   Ctx Count in 10 min:     Duration:     Intensity: Contraction Intensity: no contractions   Intensity by IUPC:     Resting Tone:     Resting Tone by IUPC:     [place] Units:      NST                      Indication:  Headache   Start time: 0900                 End time: 0940  15 x 15 accels x 2  Yes  No decels  Baseline  130s  Reactive NST - Yes           Assessment/Plan  1. Intrauterine pregnancy at 34w1d gestation with reactive fetal status  2. Swelling - Some lower extremity swelling and left wrist carpel tunnel syndrome due to pregnancy  - given education and reassurance    3.  Headache - mild and encouraged her to stay hydrated and take some Tylenol.   She has normal BPs and no concerns of pre-eclampsia   4.  Questions answered   5.  D/C home.         Markell Seymour MD  5/18/2025  09:47 EDT

## 2025-06-05 LAB — EXTERNAL GROUP B STREP ANTIGEN: NEGATIVE

## 2025-06-25 ENCOUNTER — HOSPITAL ENCOUNTER (INPATIENT)
Facility: HOSPITAL | Age: 30
LOS: 3 days | Discharge: HOME | End: 2025-06-28
Attending: ADVANCED PRACTICE MIDWIFE | Admitting: OBSTETRICS & GYNECOLOGY
Payer: COMMERCIAL

## 2025-06-25 ENCOUNTER — ANESTHESIA EVENT (OUTPATIENT)
Dept: LABOR AND DELIVERY | Facility: HOSPITAL | Age: 30
End: 2025-06-25
Payer: COMMERCIAL

## 2025-06-25 ENCOUNTER — ANESTHESIA (OUTPATIENT)
Dept: LABOR AND DELIVERY | Facility: HOSPITAL | Age: 30
End: 2025-06-25
Payer: COMMERCIAL

## 2025-06-25 PROBLEM — Z3A.39 39 WEEKS GESTATION OF PREGNANCY: Status: ACTIVE | Noted: 2025-06-25

## 2025-06-25 LAB
ABO GROUP BLD: NORMAL
ABO GROUP BLD: NORMAL
BLD GP AB SCN SERPL QL: NEGATIVE
DEPRECATED RDW RBC AUTO: 41.9 FL (ref 37–54)
ERYTHROCYTE [DISTWIDTH] IN BLOOD BY AUTOMATED COUNT: 12.7 % (ref 12.3–15.4)
HCT VFR BLD AUTO: 39 % (ref 34–46.6)
HGB BLD-MCNC: 13 G/DL (ref 12–15.9)
MCH RBC QN AUTO: 30.7 PG (ref 26.6–33)
MCHC RBC AUTO-ENTMCNC: 33.3 G/DL (ref 31.5–35.7)
MCV RBC AUTO: 92 FL (ref 79–97)
PLATELET # BLD AUTO: 257 10*3/MM3 (ref 140–450)
PMV BLD AUTO: 11 FL (ref 6–12)
RBC # BLD AUTO: 4.24 10*6/MM3 (ref 3.77–5.28)
RH BLD: POSITIVE
RH BLD: POSITIVE
T&S EXPIRATION DATE: NORMAL
TREPONEMA PALLIDUM IGG+IGM AB [PRESENCE] IN SERUM OR PLASMA BY IMMUNOASSAY: NORMAL
WBC NRBC COR # BLD AUTO: 19.18 10*3/MM3 (ref 3.4–10.8)

## 2025-06-25 PROCEDURE — 86850 RBC ANTIBODY SCREEN: CPT | Performed by: ADVANCED PRACTICE MIDWIFE

## 2025-06-25 PROCEDURE — 85027 COMPLETE CBC AUTOMATED: CPT | Performed by: ADVANCED PRACTICE MIDWIFE

## 2025-06-25 PROCEDURE — 25010000002 FENTANYL CITRATE (PF) 50 MCG/ML SOLUTION: Performed by: ANESTHESIOLOGY

## 2025-06-25 PROCEDURE — 25810000003 LACTATED RINGERS SOLUTION: Performed by: ADVANCED PRACTICE MIDWIFE

## 2025-06-25 PROCEDURE — 86900 BLOOD TYPING SEROLOGIC ABO: CPT | Performed by: ADVANCED PRACTICE MIDWIFE

## 2025-06-25 PROCEDURE — 59025 FETAL NON-STRESS TEST: CPT

## 2025-06-25 PROCEDURE — 25010000002 LIDOCAINE-EPINEPHRINE (PF) 1.5 %-1:200000 SOLUTION: Performed by: ANESTHESIOLOGY

## 2025-06-25 PROCEDURE — C1755 CATHETER, INTRASPINAL: HCPCS | Performed by: ANESTHESIOLOGY

## 2025-06-25 PROCEDURE — 99202 OFFICE O/P NEW SF 15 MIN: CPT | Performed by: OBSTETRICS & GYNECOLOGY

## 2025-06-25 PROCEDURE — 25010000002 BUPIVACAINE (PF) 0.25 % SOLUTION: Performed by: ANESTHESIOLOGY

## 2025-06-25 PROCEDURE — 25810000003 LACTATED RINGERS SOLUTION: Performed by: ANESTHESIOLOGY

## 2025-06-25 PROCEDURE — 86901 BLOOD TYPING SEROLOGIC RH(D): CPT

## 2025-06-25 PROCEDURE — 51702 INSERT TEMP BLADDER CATH: CPT

## 2025-06-25 PROCEDURE — 86901 BLOOD TYPING SEROLOGIC RH(D): CPT | Performed by: ADVANCED PRACTICE MIDWIFE

## 2025-06-25 PROCEDURE — 25010000002 ROPIVACAINE PER 1 MG: Performed by: ANESTHESIOLOGY

## 2025-06-25 PROCEDURE — 86900 BLOOD TYPING SEROLOGIC ABO: CPT

## 2025-06-25 PROCEDURE — 86780 TREPONEMA PALLIDUM: CPT | Performed by: ADVANCED PRACTICE MIDWIFE

## 2025-06-25 RX ORDER — FENTANYL CITRATE 50 UG/ML
INJECTION, SOLUTION INTRAMUSCULAR; INTRAVENOUS AS NEEDED
Status: DISCONTINUED | OUTPATIENT
Start: 2025-06-25 | End: 2025-06-26 | Stop reason: SURG

## 2025-06-25 RX ORDER — LIDOCAINE HYDROCHLORIDE 10 MG/ML
0.5 INJECTION, SOLUTION EPIDURAL; INFILTRATION; INTRACAUDAL; PERINEURAL ONCE AS NEEDED
Status: DISCONTINUED | OUTPATIENT
Start: 2025-06-25 | End: 2025-06-26 | Stop reason: HOSPADM

## 2025-06-25 RX ORDER — ROPIVACAINE HYDROCHLORIDE 2 MG/ML
15 INJECTION, SOLUTION EPIDURAL; INFILTRATION; PERINEURAL CONTINUOUS
Status: DISCONTINUED | OUTPATIENT
Start: 2025-06-25 | End: 2025-06-27

## 2025-06-25 RX ORDER — METOCLOPRAMIDE HYDROCHLORIDE 5 MG/ML
10 INJECTION INTRAMUSCULAR; INTRAVENOUS ONCE AS NEEDED
Status: DISCONTINUED | OUTPATIENT
Start: 2025-06-25 | End: 2025-06-26 | Stop reason: HOSPADM

## 2025-06-25 RX ORDER — SODIUM CHLORIDE, SODIUM LACTATE, POTASSIUM CHLORIDE, CALCIUM CHLORIDE 600; 310; 30; 20 MG/100ML; MG/100ML; MG/100ML; MG/100ML
125 INJECTION, SOLUTION INTRAVENOUS CONTINUOUS
Status: DISCONTINUED | OUTPATIENT
Start: 2025-06-25 | End: 2025-06-27

## 2025-06-25 RX ORDER — BUTORPHANOL TARTRATE 1 MG/ML
1 INJECTION, SOLUTION INTRAMUSCULAR; INTRAVENOUS
Status: DISCONTINUED | OUTPATIENT
Start: 2025-06-25 | End: 2025-06-26 | Stop reason: HOSPADM

## 2025-06-25 RX ORDER — SODIUM CHLORIDE 9 MG/ML
40 INJECTION, SOLUTION INTRAVENOUS AS NEEDED
Status: DISCONTINUED | OUTPATIENT
Start: 2025-06-25 | End: 2025-06-26 | Stop reason: HOSPADM

## 2025-06-25 RX ORDER — BUPIVACAINE HYDROCHLORIDE 2.5 MG/ML
INJECTION, SOLUTION EPIDURAL; INFILTRATION; INTRACAUDAL; PERINEURAL AS NEEDED
Status: DISCONTINUED | OUTPATIENT
Start: 2025-06-25 | End: 2025-06-26 | Stop reason: SURG

## 2025-06-25 RX ORDER — CITRIC ACID/SODIUM CITRATE 334-500MG
30 SOLUTION, ORAL ORAL ONCE
Status: DISCONTINUED | OUTPATIENT
Start: 2025-06-25 | End: 2025-06-26 | Stop reason: HOSPADM

## 2025-06-25 RX ORDER — EPHEDRINE SULFATE 5 MG/ML
10 INJECTION INTRAVENOUS
Status: DISCONTINUED | OUTPATIENT
Start: 2025-06-25 | End: 2025-06-26 | Stop reason: HOSPADM

## 2025-06-25 RX ORDER — LIDOCAINE HYDROCHLORIDE AND EPINEPHRINE 15; 5 MG/ML; UG/ML
INJECTION, SOLUTION EPIDURAL AS NEEDED
Status: DISCONTINUED | OUTPATIENT
Start: 2025-06-25 | End: 2025-06-26 | Stop reason: SURG

## 2025-06-25 RX ORDER — FAMOTIDINE 10 MG/ML
20 INJECTION, SOLUTION INTRAVENOUS EVERY 12 HOURS PRN
Status: DISCONTINUED | OUTPATIENT
Start: 2025-06-25 | End: 2025-06-26 | Stop reason: HOSPADM

## 2025-06-25 RX ORDER — FAMOTIDINE 20 MG/1
20 TABLET, FILM COATED ORAL EVERY 12 HOURS PRN
Status: DISCONTINUED | OUTPATIENT
Start: 2025-06-25 | End: 2025-06-26 | Stop reason: HOSPADM

## 2025-06-25 RX ORDER — EPHEDRINE SULFATE 5 MG/ML
INJECTION INTRAVENOUS
Status: DISCONTINUED
Start: 2025-06-25 | End: 2025-06-28 | Stop reason: HOSPADM

## 2025-06-25 RX ORDER — ONDANSETRON 4 MG/1
4 TABLET, ORALLY DISINTEGRATING ORAL EVERY 6 HOURS PRN
Status: DISCONTINUED | OUTPATIENT
Start: 2025-06-25 | End: 2025-06-26 | Stop reason: HOSPADM

## 2025-06-25 RX ORDER — DIPHENHYDRAMINE HYDROCHLORIDE 50 MG/ML
12.5 INJECTION, SOLUTION INTRAMUSCULAR; INTRAVENOUS EVERY 8 HOURS PRN
Status: DISCONTINUED | OUTPATIENT
Start: 2025-06-25 | End: 2025-06-26 | Stop reason: HOSPADM

## 2025-06-25 RX ORDER — BUTORPHANOL TARTRATE 2 MG/ML
2 INJECTION, SOLUTION INTRAMUSCULAR; INTRAVENOUS
Status: DISCONTINUED | OUTPATIENT
Start: 2025-06-25 | End: 2025-06-26 | Stop reason: HOSPADM

## 2025-06-25 RX ORDER — SODIUM CHLORIDE 0.9 % (FLUSH) 0.9 %
10 SYRINGE (ML) INJECTION AS NEEDED
Status: DISCONTINUED | OUTPATIENT
Start: 2025-06-25 | End: 2025-06-26 | Stop reason: HOSPADM

## 2025-06-25 RX ORDER — OXYTOCIN/0.9 % SODIUM CHLORIDE 30/500 ML
2-20 PLASTIC BAG, INJECTION (ML) INTRAVENOUS
Status: DISCONTINUED | OUTPATIENT
Start: 2025-06-25 | End: 2025-06-27

## 2025-06-25 RX ORDER — ACETAMINOPHEN 325 MG/1
650 TABLET ORAL EVERY 4 HOURS PRN
Status: DISCONTINUED | OUTPATIENT
Start: 2025-06-25 | End: 2025-06-26 | Stop reason: HOSPADM

## 2025-06-25 RX ORDER — ONDANSETRON 2 MG/ML
4 INJECTION INTRAMUSCULAR; INTRAVENOUS EVERY 6 HOURS PRN
Status: DISCONTINUED | OUTPATIENT
Start: 2025-06-25 | End: 2025-06-26 | Stop reason: HOSPADM

## 2025-06-25 RX ORDER — MAGNESIUM CARB/ALUMINUM HYDROX 105-160MG
30 TABLET,CHEWABLE ORAL ONCE
Status: COMPLETED | OUTPATIENT
Start: 2025-06-25 | End: 2025-06-26

## 2025-06-25 RX ORDER — SODIUM CHLORIDE 0.9 % (FLUSH) 0.9 %
10 SYRINGE (ML) INJECTION EVERY 12 HOURS SCHEDULED
Status: DISCONTINUED | OUTPATIENT
Start: 2025-06-25 | End: 2025-06-26 | Stop reason: HOSPADM

## 2025-06-25 RX ADMIN — SODIUM CHLORIDE, POTASSIUM CHLORIDE, SODIUM LACTATE AND CALCIUM CHLORIDE 500 ML: 600; 310; 30; 20 INJECTION, SOLUTION INTRAVENOUS at 12:20

## 2025-06-25 RX ADMIN — LIDOCAINE HYDROCHLORIDE AND EPINEPHRINE 3 ML: 15; 5 INJECTION, SOLUTION EPIDURAL at 19:25

## 2025-06-25 RX ADMIN — SODIUM CHLORIDE, POTASSIUM CHLORIDE, SODIUM LACTATE AND CALCIUM CHLORIDE 500 ML: 600; 310; 30; 20 INJECTION, SOLUTION INTRAVENOUS at 18:56

## 2025-06-25 RX ADMIN — ROPIVACAINE HYDROCHLORIDE 14 ML/HR: 2 INJECTION, SOLUTION EPIDURAL; INFILTRATION at 19:31

## 2025-06-25 RX ADMIN — BUPIVACAINE HYDROCHLORIDE 10 ML: 2.5 INJECTION, SOLUTION EPIDURAL; INFILTRATION; INTRACAUDAL; PERINEURAL at 19:27

## 2025-06-25 RX ADMIN — LIDOCAINE HYDROCHLORIDE AND EPINEPHRINE 2 ML: 15; 5 INJECTION, SOLUTION EPIDURAL at 19:26

## 2025-06-25 RX ADMIN — Medication 2 MILLI-UNITS/MIN: at 22:00

## 2025-06-25 RX ADMIN — FENTANYL CITRATE 100 MCG: 50 INJECTION, SOLUTION INTRAMUSCULAR; INTRAVENOUS at 19:27

## 2025-06-25 NOTE — ANESTHESIA PREPROCEDURE EVALUATION
Anesthesia Evaluation     Patient summary reviewed and Nursing notes reviewed                Airway   Mallampati: II  Neck ROM: full  No difficulty expected  Dental      Pulmonary - negative pulmonary ROS   Cardiovascular - negative cardio ROS        Neuro/Psych- negative ROS  GI/Hepatic/Renal/Endo - negative ROS     Musculoskeletal (-) negative ROS    Abdominal    Substance History - negative use     OB/GYN    (+) Pregnant        Other                    Anesthesia Plan    ASA 2     epidural       Anesthetic plan, risks, benefits, and alternatives have been provided, discussed and informed consent has been obtained with: patient.    CODE STATUS:    Code Status (Patient has no pulse and is not breathing): CPR (Attempt to Resuscitate)  Medical Interventions (Patient has pulse or is breathing): Full Support  Level Of Support Discussed With: Patient

## 2025-06-25 NOTE — H&P
"[facility]  Obstetric History and Physical    Chief Complaint   Patient presents with    Rupture of Membranes       Subjective     Patient is a 29 y.o. female  currently at 39w4d, who presents for term labor  with ROM. She voices good fetal movement. She denies vaginal bleeding. She notes SROM occurred at 0300. She plans no epidural for labor and birth.     Her prenatal care is benign.  Her previous obstetric/gynecological history is non-contributory.    The following portions of the patients history were reviewed and updated as appropriate: current medications, allergies, past medical history, past surgical history, past family history, past social history, and problem list .       Prenatal Information:   Maternal Prenatal Labs  Blood Type No results found for: \"ABO\"   Rh Status No results found for: \"RH\"   Antibody Screen No results found for: \"ABSCRN\"   Gonnorhea No results found for: \"GCCX\"   Chlamydia No results found for: \"CLAMYDCU\"   RPR No results found for: \"RPR\"   Syphilis Antibody No results found for: \"SYPHILIS\"   Rubella No results found for: \"RUBELLAIGGIN\"   Hepatitis B Surface Antigen No results found for: \"HEPBSAG\"   HIV-1 Antibody No results found for: \"LABHIV1\"   Hepatitis C Antibody No results found for: \"HEPCAB\"   Rapid Urin Drug Screen No results found for: \"AMPMETHU\", \"BARBITSCNUR\", \"LABBENZSCN\", \"LABMETHSCN\", \"LABOPIASCN\", \"THCURSCR\", \"COCAINEUR\", \"AMPHETSCREEN\", \"PROPOXSCN\", \"BUPRENORSCNU\", \"METAMPSCNUR\", \"OXYCODONESCN\", \"TRICYCLICSCN\"   Group B Strep Culture Negative                 Past OB History:       OB History    Para Term  AB Living   2 0 0 0 1 0   SAB IAB Ectopic Molar Multiple Live Births   0 0 0 0 0 0      # Outcome Date GA Lbr Ted/2nd Weight Sex Type Anes PTL Lv   2 Current            1 AB               Obstetric Comments   Chemical pregnancy       Past Medical History: Past Medical History:   Diagnosis Date    Dysmenorrhea     HPV vaccines complete     " Irregular menses     Pap smear 08/19/2020    pap w/ reflex, GC/CHL- negative    Pap smear 08/24/2020    pap w/ reflex- negative      Past Surgical History History reviewed. No pertinent surgical history.   Family History: Family History   Problem Relation Age of Onset    No Known Problems Father     No Known Problems Mother     Breast cancer Neg Hx     Ovarian cancer Neg Hx     Uterine cancer Neg Hx     Colon cancer Neg Hx       Social History:  reports that she has never smoked. She has never been exposed to tobacco smoke. She has never used smokeless tobacco.   reports that she does not currently use alcohol.   reports no history of drug use.        REVIEW OF SYSTEMS             Reports fetal movement is normal             Reports leakage of amniotic fluid.             Denies vaginal bleeding             She reports Regular contractions, frequency: Every 3 minutes, intensity mild             All other systems reviewed and are negative    Objective       Vital Signs Range for the last 24 hours  Temperature: Temp:  [97.9 °F (36.6 °C)] 97.9 °F (36.6 °C)   Temp Source: Temp src: Oral   BP: BP: (127-129)/(85-91) 127/85   Pulse: Heart Rate:  [86] 86   Respirations: Resp:  [16] 16   SPO2: SpO2:  [96 %] 96 %   O2 Amount (l/min):     O2 Devices     Weight: Weight:  [91.2 kg (201 lb)] 91.2 kg (201 lb)       Presentation: vertex   Cervix: Exam by:  RN   Dilation:  2   Effacement: Cervical Effacement: 60   Station:  -2       Fetal Heart Rate Assessment   Method: Fetal HR Assessment Method: external   Beats/min: Fetal HR (beats/min): 120   Baseline: Fetal HR Baseline: normal range   Varibility: Fetal HR Variability: marked (amplitude greater than 25 bpm)   Accels: Fetal HR Accelerations: greater than/equal to 15 bpm, lasting at least 15 seconds   Decels: Fetal HR Decelerations: absent   Tracing Category:  1    NST: REACTIVE     Uterine Assessment   Method: Method: external tocotransducer   Frequency (min): Contraction  Frequency (Minutes): 3-4   Ctx Count in 10 min:     Duration:     Intensity: Contraction Intensity: mild by palpation (mild to moderate)   Intensity by IUPC:     Resting Tone: Uterine Resting Tone: soft by palpation   Resting Tone by IUPC:     [place] Units:             Constitutional:  Well developed, well nourished, no acute distress, well-groomed.   Respiratory:  Resp e/e/u.   Cardiovascular:  Normal rate and rhythm, no murmurs.   Gastrointestinal:  Soft, gravid, nontender.  Uterus: Soft, nontender. Fundus appropriate for dates.  Neurologic:  Alert & oriented x 3,  no focal deficits noted.   Psychiatric:  Speech and behavior appropriate.   Extremities: no cyanosis, clubbing or edema, no evidence of DVT.        Labs:  Lab Results   Component Value Date    WBC 14.86 (H) 04/10/2025    HGB 12.9 04/10/2025    HCT 39.7 04/10/2025    MCV 94.5 04/10/2025     04/10/2025               Assessment & Plan       39 weeks gestation of pregnancy        Assessment:  1.  Intrauterine pregnancy at 39w4d weeks gestation with reactive fetal status.    2.   term labor  with ROM  3.  Obstetrical history is non-contributory.  4.  GBS status: Negative    Plan:  1.Expectant management. Discussed use of breast pump and position changes to augment labor. Discussed possible use of oxytocin to augment if no active labor by late afternoon/early evening.   2. Plan of care has been reviewed with patient and questions answered.  3.  Risks, benefits of treatment plan have been discussed.  4.  All questions have been answered.        Juliane Chaudhry CNM  6/25/2025  12:42 EDT

## 2025-06-25 NOTE — ANESTHESIA PROCEDURE NOTES
Labor Epidural      Patient reassessed immediately prior to procedure    Patient location during procedure: OB  Performed By  Anesthesiologist: Luís Dias DO  Preanesthetic Checklist  Completed: patient identified, IV checked, site marked, risks and benefits discussed, surgical consent, monitors and equipment checked, pre-op evaluation and timeout performed  Prep:  Pt Position:sitting  Sterile Tech:gloves, mask, sterile barrier and cap  Prep:chlorhexidine gluconate and isopropyl alcohol  Monitoring:blood pressure monitoring and continuous pulse oximetry  Epidural Block Procedure:  Approach:midline  Guidance:landmark technique and palpation technique  Location:L3-L4  Needle Type:Tuohy  Needle Gauge:17 G  Loss of Resistance Medium: air  Loss of Resistance: 6cm  Cath Depth at skin:12 cm  Paresthesia: none  Aspiration:negative  Test Dose:negative  Number of Attempts: 1  Post Assessment:  Dressing:secured with tape and occlusive dressing applied (Tegaderm Placed)  Pt Tolerance:patient tolerated the procedure well with no apparent complications  Complications:no

## 2025-06-25 NOTE — PROGRESS NOTES
"06/25/25  17:20 EDT  Иван Baker      ASSESSMENTS: Иван is breathing through contractions with good control. She has found little relief with nitrous oxide.     /74   Pulse 83   Temp 97.6 °F (36.4 °C) (Oral)   Resp 18   Ht 165.1 cm (65\")   Wt 91.2 kg (201 lb)   LMP 09/21/2024   SpO2 96%   Breastfeeding No   BMI 33.45 kg/m²     Fetal Heart Rate Assessment   Method: Fetal HR Assessment Method: external   Beats/min: Fetal HR (beats/min): 115   Baseline: Fetal HR Baseline: normal range   Varibility: Fetal HR Variability: moderate (amplitude range 6 to 25 bpm)   Accels: Fetal HR Accelerations: absent   Decels: Fetal HR Decelerations: absent   Tracing Category:  1     Uterine Assessment   Method: Method: external tocotransducer   Frequency (min): Contraction Frequency (Minutes): 3-6   Ctx Count in 10 min:     Duration:     Intensity: Contraction Intensity: moderate by palpation   Intensity by IUPC:     Resting Tone: Uterine Resting Tone: soft by palpation   Resting Tone by IUPC:     [place] Units:                                Presentation: vertex   Cervix: Exam by: Method: sterile vaginal exam performed (Tj Arriola RN)   Dilation: Cervical Dilation (cm): 5   Effacement: Cervical Effacement: 80   Station: Fetal Station: -1            Lab Results   Component Value Date    WBC 19.18 (H) 06/25/2025    HGB 13.0 06/25/2025    HCT 39.0 06/25/2025    MCV 92.0 06/25/2025     06/25/2025     Results from last 7 days   Lab Units 06/25/25  1230   ABO TYPING  A   RH TYPING  Positive   ANTIBODY SCREEN  Negative       PLAN: Continue current management. Ok to get in tub.     Juliane Chaudhry CNM  17:20 EDT  06/25/25  "

## 2025-06-25 NOTE — PROGRESS NOTES
"06/25/25  19:25 EDT  Иван Baker    SUBJECTIVE: ready for epidural.  Contractions painful.       ASSESSMENTS:     /84   Pulse 82   Temp 97.7 °F (36.5 °C) (Oral)   Resp 18   Ht 165.1 cm (65\")   Wt 91.2 kg (201 lb)   LMP 09/21/2024   SpO2 96%   Breastfeeding No   BMI 33.45 kg/m²     Fetal Heart Rate Assessment   Method: Fetal HR Assessment Method: external   Beats/min: Fetal HR (beats/min): 125   Baseline: Fetal HR Baseline: normal range   Varibility: Fetal HR Variability: moderate (amplitude range 6 to 25 bpm)   Accels: Fetal HR Accelerations: greater than/equal to 15 bpm, lasting at least 15 seconds   Decels: Fetal HR Decelerations: absent   Tracing Category:  1     Uterine Assessment   Method: Method: external tocotransducer   Frequency (min): Contraction Frequency (Minutes): 3-5   Ctx Count in 10 min:     Duration:     Intensity: Contraction Intensity: moderate by palpation   Intensity by IUPC:     Resting Tone: Uterine Resting Tone: soft by palpation   Resting Tone by IUPC:       Presentation: vtx   Cervix: Exam by: Method: sterile vaginal exam performed (Tj Arriola RN)   Dilation: Cervical Dilation (cm): 6   Effacement: Cervical Effacement: 90   Station: Fetal Station: -1            IUP at 39w4d   SROM          PLAN:  Epidural now  Expectant management.  Discussed pitocin augmentation prn  Repeat SVE in 2h or prn    Lakesha Lee MD  19:25 EDT  06/25/25     " Left message for patients daughter to call Dr. Bruce office to reschedule office visit. Thank you.

## 2025-06-26 PROBLEM — Z3A.39 39 WEEKS GESTATION OF PREGNANCY: Status: RESOLVED | Noted: 2025-06-25 | Resolved: 2025-06-26

## 2025-06-26 PROCEDURE — 0KQM0ZZ REPAIR PERINEUM MUSCLE, OPEN APPROACH: ICD-10-PCS | Performed by: OBSTETRICS & GYNECOLOGY

## 2025-06-26 PROCEDURE — 25810000003 LACTATED RINGERS PER 1000 ML: Performed by: ADVANCED PRACTICE MIDWIFE

## 2025-06-26 RX ORDER — HYDROCODONE BITARTRATE AND ACETAMINOPHEN 5; 325 MG/1; MG/1
1 TABLET ORAL EVERY 4 HOURS PRN
Status: DISCONTINUED | OUTPATIENT
Start: 2025-06-26 | End: 2025-06-28 | Stop reason: HOSPADM

## 2025-06-26 RX ORDER — SIMETHICONE 80 MG
80 TABLET,CHEWABLE ORAL 4 TIMES DAILY PRN
Status: DISCONTINUED | OUTPATIENT
Start: 2025-06-26 | End: 2025-06-28 | Stop reason: HOSPADM

## 2025-06-26 RX ORDER — OXYTOCIN/0.9 % SODIUM CHLORIDE 30/500 ML
999 PLASTIC BAG, INJECTION (ML) INTRAVENOUS ONCE
Status: COMPLETED | OUTPATIENT
Start: 2025-06-26 | End: 2025-06-26

## 2025-06-26 RX ORDER — HYDROCORTISONE 25 MG/G
1 CREAM TOPICAL AS NEEDED
Status: DISCONTINUED | OUTPATIENT
Start: 2025-06-26 | End: 2025-06-28 | Stop reason: HOSPADM

## 2025-06-26 RX ORDER — ACETAMINOPHEN 325 MG/1
650 TABLET ORAL EVERY 6 HOURS PRN
Status: DISCONTINUED | OUTPATIENT
Start: 2025-06-26 | End: 2025-06-28 | Stop reason: HOSPADM

## 2025-06-26 RX ORDER — DOCUSATE SODIUM 100 MG/1
100 CAPSULE, LIQUID FILLED ORAL 2 TIMES DAILY
Status: DISCONTINUED | OUTPATIENT
Start: 2025-06-26 | End: 2025-06-28 | Stop reason: HOSPADM

## 2025-06-26 RX ORDER — HYDROCODONE BITARTRATE AND ACETAMINOPHEN 10; 325 MG/1; MG/1
1 TABLET ORAL EVERY 4 HOURS PRN
Status: DISCONTINUED | OUTPATIENT
Start: 2025-06-26 | End: 2025-06-28 | Stop reason: HOSPADM

## 2025-06-26 RX ORDER — ONDANSETRON 4 MG/1
4 TABLET, ORALLY DISINTEGRATING ORAL EVERY 6 HOURS PRN
Status: DISCONTINUED | OUTPATIENT
Start: 2025-06-26 | End: 2025-06-28 | Stop reason: HOSPADM

## 2025-06-26 RX ORDER — METHYLERGONOVINE MALEATE 0.2 MG/ML
200 INJECTION INTRAVENOUS ONCE AS NEEDED
Status: DISCONTINUED | OUTPATIENT
Start: 2025-06-26 | End: 2025-06-26 | Stop reason: HOSPADM

## 2025-06-26 RX ORDER — OXYTOCIN/0.9 % SODIUM CHLORIDE 30/500 ML
250 PLASTIC BAG, INJECTION (ML) INTRAVENOUS CONTINUOUS
Status: ACTIVE | OUTPATIENT
Start: 2025-06-26 | End: 2025-06-26

## 2025-06-26 RX ORDER — POLYETHYLENE GLYCOL 3350 17 G/17G
17 POWDER, FOR SOLUTION ORAL DAILY PRN
Status: DISCONTINUED | OUTPATIENT
Start: 2025-06-26 | End: 2025-06-28 | Stop reason: HOSPADM

## 2025-06-26 RX ORDER — ONDANSETRON 2 MG/ML
4 INJECTION INTRAMUSCULAR; INTRAVENOUS EVERY 6 HOURS PRN
Status: DISCONTINUED | OUTPATIENT
Start: 2025-06-26 | End: 2025-06-28 | Stop reason: HOSPADM

## 2025-06-26 RX ORDER — SODIUM CHLORIDE 0.9 % (FLUSH) 0.9 %
1-10 SYRINGE (ML) INJECTION AS NEEDED
Status: DISCONTINUED | OUTPATIENT
Start: 2025-06-26 | End: 2025-06-28 | Stop reason: HOSPADM

## 2025-06-26 RX ORDER — CARBOPROST TROMETHAMINE 250 UG/ML
250 INJECTION, SOLUTION INTRAMUSCULAR
Status: DISCONTINUED | OUTPATIENT
Start: 2025-06-26 | End: 2025-06-26 | Stop reason: HOSPADM

## 2025-06-26 RX ORDER — BISACODYL 10 MG
10 SUPPOSITORY, RECTAL RECTAL DAILY PRN
Status: DISCONTINUED | OUTPATIENT
Start: 2025-06-27 | End: 2025-06-28 | Stop reason: HOSPADM

## 2025-06-26 RX ORDER — PRENATAL VIT/IRON FUM/FOLIC AC 27MG-0.8MG
1 TABLET ORAL DAILY
Status: DISCONTINUED | OUTPATIENT
Start: 2025-06-26 | End: 2025-06-28 | Stop reason: HOSPADM

## 2025-06-26 RX ORDER — OXYTOCIN/0.9 % SODIUM CHLORIDE 30/500 ML
125 PLASTIC BAG, INJECTION (ML) INTRAVENOUS ONCE AS NEEDED
Status: DISCONTINUED | OUTPATIENT
Start: 2025-06-26 | End: 2025-06-28 | Stop reason: HOSPADM

## 2025-06-26 RX ORDER — MISOPROSTOL 200 UG/1
800 TABLET ORAL ONCE AS NEEDED
Status: DISCONTINUED | OUTPATIENT
Start: 2025-06-26 | End: 2025-06-26 | Stop reason: HOSPADM

## 2025-06-26 RX ORDER — IBUPROFEN 600 MG/1
600 TABLET, FILM COATED ORAL EVERY 6 HOURS PRN
Status: DISCONTINUED | OUTPATIENT
Start: 2025-06-26 | End: 2025-06-28 | Stop reason: HOSPADM

## 2025-06-26 RX ADMIN — Medication 1 APPLICATION: at 08:35

## 2025-06-26 RX ADMIN — IBUPROFEN 600 MG: 600 TABLET, FILM COATED ORAL at 08:35

## 2025-06-26 RX ADMIN — ACETAMINOPHEN 650 MG: 325 TABLET ORAL at 13:22

## 2025-06-26 RX ADMIN — ACETAMINOPHEN 650 MG: 325 TABLET ORAL at 20:16

## 2025-06-26 RX ADMIN — IBUPROFEN 600 MG: 600 TABLET, FILM COATED ORAL at 16:27

## 2025-06-26 RX ADMIN — DOCUSATE SODIUM 100 MG: 100 CAPSULE, LIQUID FILLED ORAL at 08:35

## 2025-06-26 RX ADMIN — MINERAL OIL 30 ML: 1000 SOLUTION ORAL at 05:00

## 2025-06-26 RX ADMIN — WITCH HAZEL: 500 SOLUTION RECTAL; TOPICAL at 08:35

## 2025-06-26 RX ADMIN — DOCUSATE SODIUM 100 MG: 100 CAPSULE, LIQUID FILLED ORAL at 20:16

## 2025-06-26 RX ADMIN — SODIUM CHLORIDE, POTASSIUM CHLORIDE, SODIUM LACTATE AND CALCIUM CHLORIDE 125 ML/HR: 600; 310; 30; 20 INJECTION, SOLUTION INTRAVENOUS at 03:17

## 2025-06-26 RX ADMIN — Medication 999 ML/HR: at 05:22

## 2025-06-26 RX ADMIN — Medication: at 08:35

## 2025-06-26 NOTE — L&D DELIVERY NOTE
" [facility]   Vaginal Delivery Note    Patient Name: Иван Baker  : 1995  MRN: [id number]    Date of Delivery: 2025    Diagnosis     Pre & Post-Delivery:  Intrauterine pregnancy at 39w5d  Labor status: Spontaneous Onset of Labor     (spontaneous vaginal delivery)             Problem List    Transfer to Postpartum     Review the Delivery Report for details.     Delivery     Delivery: Vaginal, Spontaneous    YOB: 2024   Time of Birth:  Gestational Age 5:13 AM  39w5d     Anesthesia: Epidural    Delivering clinician: Lakesha Lee   Forceps?   No   Vacuum? No    Shoulder dystocia present: No        Delivery narrative:  Uncomplicated  over a small 2nd degree laceration.  Anterior shoulder delivered with ease.  Body delivered atraumatically.  Infant vigorous at delivery so placed on maternal abdomen.  Delayed cord clamping x 1 min.  Cord doubly clamped and cut by FOB.  Laceration repaired in standard fashion using 3-0 Vicryl rapide. Placenta delivered intact with gentle cord traction.       Infant     Findings: female infant     Infant observations: Weight: 3335 g (7 lb 5.6 oz)  Length: 20 in  Observations/Comments:        Apgars: 8  @ 1 minute /    9  @ 5 minutes   Infant Name: Lisseth     Placenta & Cord         Placenta delivered  Spontaneous at   2025  5:22 AM    Cord: 3 vessels present.   Nuchal Cord?  no   Cord blood obtained: Yes   Cord gases obtained:  No   Cord gas results: Venous:  No results found for: \"PHCVEN\", \"BECVEN\"    Arterial:  No results found for: \"PHCART\", \"BECART\"     Repair     Episiotomy: None    No    Lacerations: Yes  Laceration Information  Laceration Repaired?   Perineal: 2nd Yes   Periurethral:       Labial:       Sulcus:       Vaginal:       Cervical:         Suture used for repair: 3-0 Vicryl rapide     Estimated Blood Loss: Est. Blood Loss (mL): 100 mL (25 0532)     Quantitative Blood Loss:        TOTAL BLOOD LOSS : 100 mL " (6/25/2025 10:31 AM - 6/26/2025  2:40 PM)  Complications     none    Disposition     Mother to Mother Baby/Postpartum  in stable condition currently.  Baby to remains with mom  in stable condition currently.    Lakesha Lee MD  06/26/25  14:40 EDT

## 2025-06-26 NOTE — PROGRESS NOTES
"06/26/25  03:09 EDT  Иван Baker    SUBJECTIVE: called to check on pt.  Comfortable w/ epidural.       ASSESSMENTS:     /69   Pulse 97   Temp 98.8 °F (37.1 °C) (Oral)   Resp 18   Ht 165.1 cm (65\")   Wt 91.2 kg (201 lb)   LMP 09/21/2024   SpO2 96%   Breastfeeding No   BMI 33.45 kg/m²     Fetal Heart Rate Assessment   Method: Fetal HR Assessment Method: external   Beats/min: Fetal HR (beats/min): 140   Baseline: Fetal HR Baseline: normal range   Varibility: Fetal HR Variability: moderate (amplitude range 6 to 25 bpm)   Accels: Fetal HR Accelerations: greater than/equal to 15 bpm, lasting at least 15 seconds   Decels: Fetal HR Decelerations: absent   Tracing Category:  1     Uterine Assessment   Method: Method: external tocotransducer, palpation   Frequency (min): Contraction Frequency (Minutes): 1.5-3.5   Ctx Count in 10 min:     Duration:     Intensity: Contraction Intensity: strong by palpation   Intensity by IUPC:     Resting Tone: Uterine Resting Tone: soft by palpation   Resting Tone by IUPC:       Presentation: vtx   Cervix: Exam by: Method: sterile vaginal exam performed   Dilation: Cervical Dilation (cm): 8   Effacement: Cervical Effacement: 90   Station: Fetal Station: -1            IUP at 39w4d   SROM          PLAN:  Continue pit per protocol.  Due for repeat SVE at 0330.  Cat 1 FHT    Lakesha Lee MD  03:09 EDT  06/26/25     "

## 2025-06-26 NOTE — LACTATION NOTE
06/26/25 0941   Maternal Information   Date of Referral 06/26/25   Person Making Referral lactation consultant  (Courtesy follow up visit for new delivery.)   Maternal Assessment   Breast Size Issue none   Breast Shape Bilateral:;round   Breast Density Bilateral:;soft   Nipples Bilateral:;short;graspable   Left Nipple Symptoms intact;nontender   Right Nipple Symptoms intact;nontender   Maternal Infant Feeding   Maternal Emotional State independent;receptive;relaxed   Infant Positioning clutch/football  (Right)   Signs of Milk Transfer   (2-3 suckles noted however infant simply too gaggy/spitty to nurse at this time. I enc'd a lot of skin to skin along with frequent burping.)   Pain with Feeding no   Comfort Measures Before/During Feeding suction broken using finger;maternal position adjusted;latch adjusted;infant position adjusted  (Educated & demonstrated how to get a deep latch)   Latch Assistance full assistance needed  (mother took over holding infant to breast & then skin to skin since infant too reluctant to nurse at this time. Pt encouraged to call out if they need help later on.)   Support Person Involvement actively supporting mother  (FOB at bedside & very attentive to wife & daughter. Educ on how to change diaper, burping, using bulb syringe, doc feeds/output on flowsheet along with acrocyanosis & jaundice.)   Milk Expression/Equipment   Breast Pump Type double electric, personal  (Pt has a Spectra pump for home use. Pt encouraged to pump if infant has not had a feeding after 12 hours. Otherwise pt doesn't need to pump unless feeds are consistently short, missed or supplement is given.)   Lactation Referrals   Lactation Referrals   (Enc'd pt to call outpatient lactation services if she needs any help after discharge.)

## 2025-06-26 NOTE — PROGRESS NOTES
6/26/2025  PPD #0    Subjective   Иван feels well.  Patient describes her lochia less than menses.  Perineal pain is well controlled. She is having significant back pain.   She is breastfeeding.      Objective   Temp: Temp:  [97.6 °F (36.4 °C)-99.9 °F (37.7 °C)] 97.6 °F (36.4 °C) Temp src: Oral   BP: BP: ()/(55-97) 94/56        Pulse: Heart Rate:  [] 82  RR: Resp:  [16-18] 18    General:  No acute distress   Abdomen: Fundus firm and beneath umbilicus   Pelvis: deferred     Lab Results   Component Value Date    WBC 19.18 (H) 06/25/2025    HGB 13.0 06/25/2025    HCT 39.0 06/25/2025    MCV 92.0 06/25/2025     06/25/2025     Results from last 7 days   Lab Units 06/25/25  2102 06/25/25  1230   ABO TYPING  A A   RH TYPING  Positive Positive   ANTIBODY SCREEN   --  Negative       Assessment  PPD# 0 after vaginal delivery    Plan  Routine postpartum care.  2.   Heating pad and pain meds as ordered for back pain.     This note has been electronically signed.    Juliane Chaudhry CNM  08:25 EDT  June 26, 2025

## 2025-06-27 LAB
HCT VFR BLD AUTO: 33.7 % (ref 34–46.6)
HGB BLD-MCNC: 11 G/DL (ref 12–15.9)

## 2025-06-27 PROCEDURE — 85014 HEMATOCRIT: CPT | Performed by: OBSTETRICS & GYNECOLOGY

## 2025-06-27 PROCEDURE — 85018 HEMOGLOBIN: CPT | Performed by: OBSTETRICS & GYNECOLOGY

## 2025-06-27 RX ADMIN — WITCH HAZEL: 500 SOLUTION RECTAL; TOPICAL at 18:24

## 2025-06-27 RX ADMIN — Medication: at 18:24

## 2025-06-27 RX ADMIN — ACETAMINOPHEN 650 MG: 325 TABLET ORAL at 20:47

## 2025-06-27 RX ADMIN — IBUPROFEN 600 MG: 600 TABLET, FILM COATED ORAL at 05:56

## 2025-06-27 RX ADMIN — PRENATAL VITAMINS-IRON FUMARATE 27 MG IRON-FOLIC ACID 0.8 MG TABLET 1 TABLET: at 08:24

## 2025-06-27 RX ADMIN — ACETAMINOPHEN 650 MG: 325 TABLET ORAL at 08:23

## 2025-06-27 RX ADMIN — DOCUSATE SODIUM 100 MG: 100 CAPSULE, LIQUID FILLED ORAL at 08:24

## 2025-06-27 RX ADMIN — IBUPROFEN 600 MG: 600 TABLET, FILM COATED ORAL at 14:25

## 2025-06-27 RX ADMIN — DOCUSATE SODIUM 100 MG: 100 CAPSULE, LIQUID FILLED ORAL at 20:47

## 2025-06-27 NOTE — PROGRESS NOTES
[facility]  Obstetric Progress Note    Chief Complaint: PPD #1    Subjective     Иван doing well. Pain is well controlled. Reports light lochia without passage of major clots. Ambulating. Tolerating PO intake. Voiding without difficulty or dysuria. Reports flatus.   Breast feeding. Female infant doing well. Reports good mood.   No further complaints at this time.      Objective     Vital Signs Range for the last 24 hours  Temp:  [97.8 °F (36.6 °C)-98.5 °F (36.9 °C)] 97.9 °F (36.6 °C)   BP: (110-134)/(67-78) 119/74   Heart Rate:  [] 62   Resp:  [16-20] 16                   Intake/Output last 24 hours:    No intake or output data in the 24 hours ending 06/27/25 0909    Intake/Output this shift:    No intake/output data recorded.    Physical Exam:  General: A&Ox3. No acute distress.    HEENT Normocephalic, atraumatic    Heart RRR   Lungs CTAB, unlabored breathing   Abdomen Soft, non tender, negative for guarding and rebound   Extremities Exam of extremities: peripheral pulses normal, no pedal edema, no clubbing or cyanosis   Fundus Firm, midline, below umbilicus       Laboratory Results:  Lab Results   Component Value Date    WBC 19.18 (H) 06/25/2025    HGB 11.0 (L) 06/27/2025    HCT 33.7 (L) 06/27/2025    MCV 92.0 06/25/2025     06/25/2025    HEPBSAG Negative 11/12/2024         Assessment  PPD# 1 s/p vaginal delivery      Plan  Routine postpartum care.  VSS.  Serial lochia, fundal height checks appropriate. Continue serially.   PP Hgb ordered.  Pain well controled with tylenol and ibuprofen PRN.  Encourage PO hydration, ambulation, IS.  Female infant doing well.   Breastfeeding. Lactation available for consult.     Dispo  Plan for discharge home tomorrow pending patient and infant status.        This note has been electronically signed.    Dot Barnett DO  09:09 EDT  June 27, 2025

## 2025-06-27 NOTE — LACTATION NOTE
"   25 1245   Maternal Information   Date of Referral 25   Person Making Referral patient   Maternal Reason for Referral latch difficulty   Infant Reason for Referral  infant   Maternal Assessment   Breast Size Issue none   Breast Shape Bilateral:;round   Breast Density Bilateral:;soft   Nipples Bilateral:;everted   Left Nipple Symptoms intact;nontender   Right Nipple Symptoms intact;nontender   Maternal Infant Feeding   Maternal Emotional State relaxed;receptive   Infant Positioning clutch/football  (left)   Signs of Milk Transfer none noted   Comfort Measures Before/During Feeding suction broken using finger;infant position adjusted;latch adjusted;other (see comments)  (tried XS and small nipple shield both were too painful for mom felt \"pinchy\")   Latch Assistance full assistance needed   Support Person Involvement actively supporting mother   Milk Expression/Equipment   Breast Pump Type double electric, hospital grade;double electric, personal  (I took Mom a hospital pump)   Breast Pump Flange Type hard   Breast Pump Flange Size 21 mm   Breast Pumping   Breast Pumping Interventions early pumping promoted;frequent pumping encouraged;other (see comments);post-feed pumping encouraged  (encouraged to pump q 3 hours with no latch or if too painful to feed)   Lactation Referrals   Lactation Referrals outpatient lactation program   Outpatient Lactation Program Lactation Follow-up Date/Time prn after discharge     F/U visit per Mom's/nurses request. Infant is able to latch, but unable to maintain latch longer than a couple minutes at a time. Mom states she will latch but then falls asleep or comes off after only a few sucks. Infant has very small mouth and unable to open for wide gape, slurping in Mom's nipple. SLP ordered for more thorough oral assessment.  We tried a XS and small nipple shield in left football hold. These were both too painful for Mom as she states they felt \"bitey or pinchy.\" Mom " asked if ok to pump for now and then change over to breast feeding at a later time. Yes, she can do this. Encouraged to follow-up with outpatient clinic to bring infant back to breast. Encouraged to pump q 3 hours around the clock to build optimal milk supply. She has a manual pump and I setup a hospital pump for her. She had the flanges from pumping during labor. I washed and laid those out to dry. Water in tubes. I started a cycle on the pump to dry out the tubes. Instructions given on use of hospital pump. Mom v/u. She is using manual pump at this time and got 2 ml. Dad syringe fed infant while sucking on his clean finger. Encouraged to call lactation with any questions/concerns.

## 2025-06-27 NOTE — ANESTHESIA POSTPROCEDURE EVALUATION
Patient: Иван Baker    Procedure Summary       Date: 06/25/25 Room / Location:     Anesthesia Start: 1916 Anesthesia Stop: 06/26/25 0522    Procedure: LABOR ANALGESIA Diagnosis:     Scheduled Providers:  Provider: Luís Dias DO    Anesthesia Type: epidural ASA Status: 2            Anesthesia Type: epidural    Vitals  Vitals Value Taken Time   /74 06/27/25 07:06   Temp 97.9 °F (36.6 °C) 06/27/25 07:06   Pulse 62 06/27/25 07:06   Resp 16 06/27/25 07:06   SpO2             Post Anesthesia Care and Evaluation    Patient location during evaluation: bedside  Patient participation: complete - patient participated  Level of consciousness: awake and alert  Pain management: adequate    Airway patency: patent  Anesthetic complications: No anesthetic complications    Cardiovascular status: acceptable  Respiratory status: acceptable  Hydration status: acceptable  Post Neuraxial Block status: Motor and sensory function returned to baseline and No signs or symptoms of PDPH

## 2025-06-28 VITALS
TEMPERATURE: 98.4 F | RESPIRATION RATE: 18 BRPM | SYSTOLIC BLOOD PRESSURE: 138 MMHG | BODY MASS INDEX: 33.49 KG/M2 | HEIGHT: 65 IN | OXYGEN SATURATION: 96 % | WEIGHT: 201 LBS | HEART RATE: 75 BPM | DIASTOLIC BLOOD PRESSURE: 88 MMHG

## 2025-06-28 RX ADMIN — PRENATAL VITAMINS-IRON FUMARATE 27 MG IRON-FOLIC ACID 0.8 MG TABLET 1 TABLET: at 09:32

## 2025-06-28 RX ADMIN — DOCUSATE SODIUM 100 MG: 100 CAPSULE, LIQUID FILLED ORAL at 09:32

## 2025-06-28 NOTE — DISCHARGE SUMMARY
[facility]  Vaginal Delivery Discharge Summary      Patient: Иван Baker      MR#:[id number]  Admission  Diagnosis: IUP @ 39w4d, labor with ROM  Discharge Diagnosis: PPD#2 s/p  @ 39w5d    Date of Admission: 2025  Date of Discharge:  2025    Procedures:  Vaginal, Spontaneous    2025   5:13 AM     Service:  Obstetrics    Hospital Course:  Patient underwent vaginal delivery and remained in the hospital for 3 days.  During that time she remained afebrile and hemodynamically stable.  On the day of discharge, she was eating, ambulating and voiding without difficulty.      Lab Results   Component Value Date    WBC 19.18 (H) 2025    HGB 11.0 (L) 2025    HCT 33.7 (L) 2025    MCV 92.0 2025     2025     Results from last 7 days   Lab Units 25  2102 25  1230   ABO TYPING  A A   RH TYPING  Positive Positive   ANTIBODY SCREEN   --  Negative       Discharge Medications     Discharge Medications        Continue These Medications        Instructions Start Date   PRENATAL VIT W/ FE BISG-FA PO   Take  by mouth.               Discharge Disposition:  To Home    Discharge Condition:  Stable    Discharge Diet: Regular    Activity at Discharge: Pelvic rest    Follow-up Appointments  6 weeks      Indira Ho MD  25  11:53 EDT

## 2025-06-28 NOTE — LACTATION NOTE
Courtesy f/u visit. Landy with speech in room when I walked in assisting Mom with latching infant. Mom states pumping has been going well. She is using silverettes for sore nipples. She got 5.5 ml last time she pumped. She states she has a friend who is going to donate milk until her full supply comes in. She is planning to follow-up with lactation/speech outpatient. She has no further questions at this time.

## 2025-06-28 NOTE — PROGRESS NOTES
6/28/2025  PPD #2    Subjective   Иван feels well.  Patient describes her lochia as less than menses.  Pain is well controlled       Objective   Temp: Temp:  [97.9 °F (36.6 °C)-99 °F (37.2 °C)] 98.4 °F (36.9 °C) Temp src: Oral   BP: BP: (122-138)/(73-88) 138/88        Pulse: Heart Rate:  [66-79] 75  RR: Resp:  [16-18] 18    General:  No acute distress   Abdomen: Fundus firm and beneath umbilicus   Pelvis: deferred     Lab Results   Component Value Date    WBC 19.18 (H) 06/25/2025    HGB 11.0 (L) 06/27/2025    HCT 33.7 (L) 06/27/2025    MCV 92.0 06/25/2025     06/25/2025    HEPBSAG Negative 11/12/2024       Assessment  PPD# 2 after vaginal delivery    Plan  Discharge to home  Follow up with LW  in 6 weeks  Advised no tampons, intercourse, or tub baths for 6 weeks.       This note has been electronically signed.    Indira Ho MD  11:52 EDT  June 28, 2025

## 2025-06-29 ENCOUNTER — MATERNAL SCREENING (OUTPATIENT)
Dept: CALL CENTER | Facility: HOSPITAL | Age: 30
End: 2025-06-29
Payer: COMMERCIAL

## 2025-06-29 NOTE — OUTREACH NOTE
Maternal Screening Survey      Flowsheet Row Responses   Eligibility Eligible   Prep survey completed? Yes   Facility patient discharged from? [facility] - Registered Nurse

## 2025-06-30 NOTE — PAYOR COMM NOTE
"Samuel Macy Burns (29 y.o. Female) [id number]       Date of Birth   1995    Social Security Number       [place]   [address] [address]    Home Phone   [phone]    MRN   [id number]       Cheondoism   None    Marital Status                               Admission Date   6/25/2025    Admission Type   Emergency    Admitting Provider   Indira Ho MD    Attending Provider       Department, Room/Bed   [facility] MOTHER BABY 4B, N437/1       Discharge Date   6/28/2025    Discharge Disposition   Home or Self Care    Discharge Destination                                 Attending Provider: (none)   Allergies: No Known Allergies    Isolation: None   Infection: None   Code Status: Prior    Ht: 165.1 cm (65\")   Wt: 91.2 kg (201 lb)    Admission Cmt: None   Principal Problem: 39 weeks gestation of pregnancy [Z3A.39]                   Active Insurance as of 6/25/2025       Primary Coverage       Payor Plan Insurance Group Employer/Plan Group    [facility] [id number]       Payor Plan Address Payor Plan Phone Number Payor Plan Fax Number Effective Dates    [address]   4/1/2025 - None Entered    [facility] [id number]         Subscriber Name Subscriber Birth Date Member ID       MACY SCHUSTER 1995 [id number]                     Emergency Contacts        (Rel.) Home Phone Work Phone Mobile Phone    Steve Schuster (Spouse) [phone]              Insurance Information                  [facility]/[employer] Phone: --    Subscriber: SamuelMacy Subscriber#: [id number]    Group#: [id number] Precert#: --    Authorization#: [id number] Effective Date: --             Discharge Summary        Indira Ho MD at 06/28/25 [facility]          [facility]  Vaginal Delivery Discharge Summary      Patient: Macy Schuster      MR#:[id number]  Admission  Diagnosis: IUP @ 39w4d, labor with " ROM  Discharge Diagnosis: PPD#2 s/p  @ 39w5d    Date of Admission: 2025  Date of Discharge:  2025    Procedures:  Vaginal, Spontaneous    2025   5:13 AM     Service:  Obstetrics    Hospital Course:  Patient underwent vaginal delivery and remained in the hospital for 3 days.  During that time she remained afebrile and hemodynamically stable.  On the day of discharge, she was eating, ambulating and voiding without difficulty.      Lab Results   Component Value Date    WBC 19.18 (H) 2025    HGB 11.0 (L) 2025    HCT 33.7 (L) 2025    MCV 92.0 2025     2025     Results from last 7 days   Lab Units 25  2102 25  1230   ABO TYPING  A A   RH TYPING  Positive Positive   ANTIBODY SCREEN   --  Negative       Discharge Medications     Discharge Medications        Continue These Medications        Instructions Start Date   PRENATAL VIT W/ FE BISG-FA PO   Take  by mouth.               Discharge Disposition:  To Home    Discharge Condition:  Stable    Discharge Diet: Regular    Activity at Discharge: Pelvic rest    Follow-up Appointments  6 weeks      Indiar Ho MD  25  11:53 EDT        Electronically signed by Indira Ho MD at 25 2764

## 2025-06-30 NOTE — PAYOR COMM NOTE
"Macy Baker (29 y.o. Female) Roseanne BROTHERS [phone] F [phone]  [id number]       Date of Birth   1995    Social Security Number       Address   [id number]    Home Phone   [phone]    MRN   [id number]       Buddhist   None    Marital Status                               Admission Date   2025    Admission Type   Emergency    Admitting Provider   Indira Ho MD    Attending Provider       Department, Room/Bed   [facility] MOTHER BABY 4B, N437/1       Discharge Date   2025    Discharge Disposition   Home or Self Care    Discharge Destination                                 Attending Provider: (none)   Allergies: No Known Allergies    Isolation: None   Infection: None   Code Status: Prior    Ht: 165.1 cm (65\")   Wt: 91.2 kg (201 lb)    Admission Cmt: None   Principal Problem: 39 weeks gestation of pregnancy [Z3A.39]                   Active Insurance as of 2025       Primary Coverage       Payor Plan Insurance Group Employer/Plan Group    [facility] [id number]       Payor Plan Address Payor Plan Phone Number Payor Plan Fax Number Effective Dates    [address]   2025 - None Entered    [facility] [id number]         Subscriber Name Subscriber Birth Date Member ID       MACY BAKER 1995 [id number]                     Emergency Contacts        (Rel.) Home Phone Work Phone Mobile Phone    Steve Baker (Spouse) [phone]              [unfilled]     Discharge Summary        Indira Ho MD at [address] [place] [address]  Vaginal Delivery Discharge Summary      Patient: Macy Baker      MR#:[id number]  Admission  Diagnosis: IUP @ 39w4d, labor with ROM  Discharge Diagnosis: PPD#2 s/p  @ 39w5d    Date of Admission: 2025  Date of Discharge:  2025    Procedures:  Vaginal, Spontaneous    2025   5:13 AM     Service:  " Obstetrics    Hospital Course:  Patient underwent vaginal delivery and remained in the hospital for 3 days.  During that time she remained afebrile and hemodynamically stable.  On the day of discharge, she was eating, ambulating and voiding without difficulty.      Lab Results   Component Value Date    WBC 19.18 (H) 06/25/2025    HGB 11.0 (L) 06/27/2025    HCT 33.7 (L) 06/27/2025    MCV 92.0 06/25/2025     06/25/2025     Results from last 7 days   Lab Units 06/25/25  2102 06/25/25  1230   ABO TYPING  A A   RH TYPING  Positive Positive   ANTIBODY SCREEN   --  Negative       Discharge Medications     Discharge Medications        Continue These Medications        Instructions Start Date   PRENATAL VIT W/ FE BISG-FA PO   Take  by mouth.               Discharge Disposition:  To Home    Discharge Condition:  Stable    Discharge Diet: Regular    Activity at Discharge: Pelvic rest    Follow-up Appointments  6 weeks      Indira Ho MD  06/28/25  11:53 EDT        Electronically signed by Indira Ho MD at 06/28/25 1156       Discharge Order (From admission, onward)       Start     Ordered    06/28/25 1154  Discharge patient  Once        Expected Discharge Date: 06/28/25   Expected Discharge Time: Morning   Discharge Disposition: Home or Self Care   Physician of Record for Attribution - Please select from Treatment Team: INDIRA HO [[id number]]   Review needed by CMO to determine Physician of Record: No      Question Answer Comment   Physician of Record for Attribution - Please select from Treatment Team INDIRA HO    Review needed by CMO to determine Physician of Record No        06/28/25 115

## 2025-07-08 ENCOUNTER — MATERNAL SCREENING (OUTPATIENT)
Dept: CALL CENTER | Facility: HOSPITAL | Age: 30
End: 2025-07-08
Payer: COMMERCIAL

## 2025-07-08 NOTE — OUTREACH NOTE
Maternal Screening Survey      Flowsheet Row Responses   Facility patient discharged from? [facility]   Attempt successful? No   Unsuccessful attempts Attempt 2              Puja RAO - Registered Nurse

## 2025-07-08 NOTE — OUTREACH NOTE
Maternal Screening Survey      Flowsheet Row Responses   Facility patient discharged from? [facility]   Attempt successful? No   Unsuccessful attempts Attempt 1              Puja RAO - Registered Nurse

## 2025-07-09 ENCOUNTER — MATERNAL SCREENING (OUTPATIENT)
Dept: CALL CENTER | Facility: HOSPITAL | Age: 30
End: 2025-07-09
Payer: COMMERCIAL

## 2025-07-09 NOTE — OUTREACH NOTE
Maternal Screening Survey      Flowsheet Row Responses   Facility patient discharged from? [facility]   Attempt successful? Yes   Call start time 162   Call end time 162   I have been able to laugh and see the funny side of things. 0   I have looked forward with enjoyment to things. 0   I have blamed myself unnecessarily when things went wrong. 0   I have been anxious or worried for no good reason. 0   I have felt scared or panicky for no good reason. 0   Things have been getting on top of me. 0   I have been so unhappy that I have had difficulty sleeping. 0   I have felt sad or miserable. 0   I have been so unhappy that I have been crying. 0   The thought of harming myself has occurred to me. 0   [place]  Depression Scale Total 0   Did any of your parents have problems with alcohol or drug use? No   Do any of your peers have problems with alcohol or drug use? No   Does your partner have problems with alcohol or drug use? No   Before you were pregnant did you have problems with alcohol or drug use? (past) No   In the past month, did you drink beer, wine, liquor or use any other drugs? (pregnancy) No   Maternal Screening call completed Yes   Call end time               Rebekah MORELOS - Registered Nurse

## 2025-07-15 ENCOUNTER — TELEPHONE (OUTPATIENT)
Dept: LACTATION | Facility: HOSPITAL | Age: 30
End: 2025-07-15
Payer: COMMERCIAL

## 2025-07-15 NOTE — TELEPHONE ENCOUNTER
Return phone call.  Patient had called requesting an outpatient lactation clinic appointment, requesting assistance with getting a deeper latch for infant with breastfeeding.  Patient stated that she was recently diagnosed with mastitis, 4 days prior, and is currently on antibiotics and states that the mastitis is improving, and she feels better.      Appointment scheduled for 7-21-25 at 8:00 am on the 4th floor of the [facility] at [facility].  Also, put on a call list if any appointments open up this week.    Discussed techniques to try for a deeper latch with infant, prior to appointment.  Discussed calling lactation back if any questions or concerns prior to appointment, and if needing to cancel please call.

## 2025-07-21 ENCOUNTER — HOSPITAL ENCOUNTER (OUTPATIENT)
Dept: LACTATION | Facility: HOSPITAL | Age: 30
Discharge: HOME | End: 2025-07-21

## 2025-07-21 NOTE — LACTATION NOTE
"Patient:  Lisseth \"Nidhi\" Samuel  :  26  PEDIATRICIAN:  [facility]  AGE:  3 weeks 4 days  GESTATION @ BIRTH:  39 WEEKS 5 DAYS  BIRTH WEIGHT:  7 LBS 5.6 OZ (3335g)  DISCHARGE WEIGHT:  6 LBS 13 OZ (3090g)  2025 CURRENT WEIGHT:  8 LBS 9.6 OZ (3905g)    MOM'S NAME:  MAYC SCHUSTER  :  1995  PHONE: [phone]  REASON FOR VISIT: Shallow Latch, Nipple Pain.      BREAST/SUPPLY CONCERNS:  Macy is finishing up antibiotics for mastitis, states symptoms are improving.  No current signs or symptoms of mastitis.  Nipples have white patchy look, along with a shiny areola.  Is pumping every 3 hours, usually producing 4 ounces.  Nidhi takes 2 bottles of expressed breast milk at night, and Macy wakes up to pump, when Nidhi is taking a bottle of expressed breast milk.  Macy breastfeeds 2-3 times a day, and provides expressed breastmilk in a bottle for the other feeding times.  Nidhi appears hungry within 1-2 hours after breastfeeding.  Macy reports that it takes Nidhi 40 minutes or longer to finish a bottle of expressed breast milk, using a Dr. Brown's bottle with a size 1 nipple.  Prior to the visit today, Macy pumped 4 ounces of breast milk, 1 hour prior to the appointment. Nidhi drank 20 mls of expressed breast milk in a bottle, immediately before start of appointment.    Minimum transfer based on current weight (if feeding every 3 hours, based on lbs x 2.5 =  21.6 ounces / 8 times = 2.7 ounces  Or 3 ounces for 7 times feeds a day.  Encouraging increasing amounts as Nidhi requests.    EXAM/ASSESSMENT:  Inpatient SLP assessment: Upper Labial Frenulum; Posterior Lingual Frenulum.     Macy nursed on Left football hold without a nipple shield for 13 minutes, needing a couple readjustments for a deep latch.  Nidhi transferred 20 mls. Macy offered the right breast in koala hold, with laid back nursing, and Nidhi transferred 15 mls in 16 minutes.  Overall, Nidhi transferred 35 mls (1.16 oz) " in a total of 29 minutes.  Roughly half of what is needed for minimum intake.    Иван offered Nidhi a bottle of expressed breast milk with almost 3 oz. Utilized a speciality disc with a transition nipple, and Nidhi was able to drink the bottle, within 15 minutes, without leaking of milk.      Due to pumping 4 ounces, 1 hour prior to the appointment, it could have affected the amount Nidhi was able to transfer at breast during the weighted feed.      On Infant's exam, infant has a white tongue, that doesn't come off with wiping.  Along with Иван's white nipples, shiny aerola, and nipple burning.  Symptoms consistent with possible Thrush. Possibly exacerbated by Иван's recent antibiotic use.    BREAST MILK PRODUCTION:  Иван reports that she has enough breast milk for Nidhi, and has some breast milk saved in the freezer.    Plan of Care:  Follow up with Pediatrician and OB/GYN/Primary care regarding infant's white tongue along with Иван's breast symptoms.  Follow up with Speech Language Pathologist - Pediatrician referral  Continue to pump every 3 hours to maintain supply.  Continue to breastfeed 1-2 times a day for 30 minutes or less for continuation of the breastfeeding relationship.  If Nidhi is showing hunger cues, follow up with a bottle of expressed breast milk.  Watch for any reoccurrence of signs or symptoms of mastitis, and follow up with OB as needed..  Follow up with OB - All Purpose Nipple Cream for sore nipples and   7.   Bottlefeeding:  Continue using Dr. Brown's Bottle, trial with a Speciality Disc with a Transition Nipple.  8.  Follow up with lactation as needed.       [id number]   Maternal Information   Date of Referral 25   Person Making Referral patient   Maternal Reason for Referral breast/nipple pain;latch difficulty   Infant Reason for Referral  infant   Maternal Assessment   Breast Size Issue none   Breast Shape Bilateral:;round   Breast Density Bilateral:;filling    Areola Bilateral:  (red and shiny)   Nipples Bilateral:;everted   Left Nipple Symptoms intact;tender  (patchy white)   Right Nipple Symptoms intact;tender  (patchy white)   Maternal Infant Feeding   Maternal Emotional State receptive;relaxed   Infant Positioning clutch/football;other (see comments)  (left football, right koala hold/laid back)   Signs of Milk Transfer deep jaw excursions noted;audible swallow  (needed top lip flanged out)   Pain with Feeding yes   Pain Location nipples, bilateral  (within initial 30 seconds)   Pain Description pulling   Comfort Measures Before/During Feeding infant position adjusted;latch adjusted;maternal position adjusted;suction broken using finger   Nipple Shape After Feeding, Left Breast pinched  (pinched in the middle, more so on the left in football)   Nipple Shape After Feeding, Right pinched  (mild pinching with the right in koala hold)   Latch Assistance minimal assistance;verbal guidance offered   Support Person Involvement actively supporting mother   Additional Documentation Breastfeeding Supplementation (Group)   Breastfeeding Supplementation   Maternal Indication for Supplementation maternal request   Infant Indication for Supplementation maternal request   Breastfeeding Supplementation Type expressed breast milk   Method of Supplementation paced bottle  (utilized a speciality feeding system with Dr. Roque's bottle)   Nipple Used For Supplementation transitional   Feeding Infant   Feeding Readiness Cues eager;finger sucking;crying;rooting;smacking;sucking motion present   Satiety Cues other (see comments)  (still hungry after 31 minutes of breasetfeeding, smacking lips, sucking on tongue)   Feeding Tolerance/Success adequate pause for breath;alert for feeding;eager;gulping;intermittent pauses;rooting;sustained alertness;uncoordinated suck/swallow/breathing   Feeding Physical Stress Cues fatigues quickly   Suck/Swallow/Breathing Coordination present   Skin-to-Skin  Length of Time (minutes)   (31)   Skin-to-Skin Contact, Duration 29   Prefeeding Weight (gm) 3905 g (137.7 oz)   Postfeeding Weight (gm) 3940 g (139 oz)   Weight Gain/Loss (gm)  35 g (1.2 oz)   Breastfeeding Session   Breastfeeding breastfeeding, bilateral   Breastfeeding Time, Left (min) 13   Breastfeeding Time, Right (min) 16   Infant-Driven Feeding Readiness©   Infant-Driven Feeding Scales - Readiness 1   Infant-Driven Feeding Scales - Quality 2   Infant-Driven Feeding Scales - Caregiver Strategies B;C;F;E  (Patient had pumped 4 ounces prior to breastfeeding session, and infant had 20ml of expressed breastmilk directly prior to appointment)   LATCH Score   Latch 2-->grasps breast, tongue down, lips flanged, rhythmic sucking   Audible Swallowing 1-->a few with stimulation   Type of Nipple 2-->everted (after stimulation)   Comfort (Breast/Nipple) 1-->filling, red/small blisters/bruises, mild/mod discomfort   Hold (Positioning) 1-->minimal assist, teach one side, mother does other, staff holds   Latch Score 7   Milk Expression/Equipment   Breast Pump Type double electric, personal  (Spectra)   Breast Pump Flange Type hard   Breast Pump Flange Size 21 mm  (patient states she thinks she has elastic nipple, the nipple fills the flange and extends to close to the end of the flange.)   Breast Pumping   Breast Pumping Interventions post-feed pumping encouraged  (encouraged to try and pump after feeds, but maintain the number of current pumping sessions she is doing a day, to prevent reoccurence of mastitis.)   Lactation Referrals   Lactation Referrals outpatient lactation program  (recommended SLP)